# Patient Record
Sex: FEMALE | Race: WHITE | NOT HISPANIC OR LATINO | Employment: FULL TIME | URBAN - NONMETROPOLITAN AREA
[De-identification: names, ages, dates, MRNs, and addresses within clinical notes are randomized per-mention and may not be internally consistent; named-entity substitution may affect disease eponyms.]

---

## 2017-01-05 ENCOUNTER — OFFICE VISIT (OUTPATIENT)
Dept: FAMILY MEDICINE | Facility: OTHER | Age: 22
End: 2017-01-05
Attending: FAMILY MEDICINE
Payer: COMMERCIAL

## 2017-01-05 VITALS
WEIGHT: 135 LBS | DIASTOLIC BLOOD PRESSURE: 64 MMHG | SYSTOLIC BLOOD PRESSURE: 104 MMHG | RESPIRATION RATE: 14 BRPM | HEIGHT: 65 IN | HEART RATE: 74 BPM | TEMPERATURE: 98.4 F | BODY MASS INDEX: 22.49 KG/M2

## 2017-01-05 DIAGNOSIS — Z30.9 ENCOUNTER FOR CONTRACEPTIVE MANAGEMENT, UNSPECIFIED CONTRACEPTIVE ENCOUNTER TYPE: ICD-10-CM

## 2017-01-05 DIAGNOSIS — N92.6 IRREGULAR MENSTRUAL CYCLE: ICD-10-CM

## 2017-01-05 DIAGNOSIS — Z71.89 ACP (ADVANCE CARE PLANNING): Primary | Chronic | ICD-10-CM

## 2017-01-05 PROCEDURE — 99213 OFFICE O/P EST LOW 20 MIN: CPT | Performed by: FAMILY MEDICINE

## 2017-01-05 RX ORDER — NORETHINDRONE ACETATE AND ETHINYL ESTRADIOL .03; 1.5 MG/1; MG/1
1 TABLET ORAL DAILY
Qty: 63 TABLET | Refills: 3 | Status: SHIPPED | OUTPATIENT
Start: 2017-01-05 | End: 2017-11-20

## 2017-01-05 ASSESSMENT — ANXIETY QUESTIONNAIRES
3. WORRYING TOO MUCH ABOUT DIFFERENT THINGS: NOT AT ALL
6. BECOMING EASILY ANNOYED OR IRRITABLE: NOT AT ALL
GAD7 TOTAL SCORE: 0
1. FEELING NERVOUS, ANXIOUS, OR ON EDGE: NOT AT ALL
2. NOT BEING ABLE TO STOP OR CONTROL WORRYING: NOT AT ALL
5. BEING SO RESTLESS THAT IT IS HARD TO SIT STILL: NOT AT ALL
7. FEELING AFRAID AS IF SOMETHING AWFUL MIGHT HAPPEN: NOT AT ALL

## 2017-01-05 ASSESSMENT — PATIENT HEALTH QUESTIONNAIRE - PHQ9: 5. POOR APPETITE OR OVEREATING: NOT AT ALL

## 2017-01-05 ASSESSMENT — PAIN SCALES - GENERAL: PAINLEVEL: NO PAIN (0)

## 2017-01-05 NOTE — MR AVS SNAPSHOT
"              After Visit Summary   1/5/2017    Nerissa Roe    MRN: [id number]           Patient Information     Date Of Birth          1995        Visit Information        Provider Department      1/5/2017 2:45 PM Ana Lilia Garland MD [facility]        Today's Diagnoses     ACP (advance care planning)    -  1     Irregular menstrual cycle         Encounter for contraceptive management, unspecified contraceptive encounter type           Care Instructions    Medication refilled.  Plan for physical/pap age 22.        Follow-ups after your visit        Who to contact     If you have questions or need follow up information about today's clinic visit or your schedule please contact [facility] directly at [phone].  Normal or non-critical lab and imaging results will be communicated to you by MyChart, letter or phone within 4 business days after the clinic has received the results. If you do not hear from us within 7 days, please contact the clinic through MyChart or phone. If you have a critical or abnormal lab result, we will notify you by phone as soon as possible.  Submit refill requests through X[employer] or call your pharmacy and they will forward the refill request to us. Please allow 3 business days for your refill to be completed.          Additional Information About Your Visit        MyChart Information     X[employer]ion lets you send messages to your doctor, view your test results, renew your prescriptions, schedule appointments and more. To sign up, go to [email or web address] . Click on \"Log in\" on the left side of the screen, which will take you to the Welcome page. Then click on \"Sign up Now\" on the right side of the page.     You will be asked to enter the access code listed below, as well as some personal information. Please follow the directions to create your username and password.     Your access code is: [email or web address]  Expires: 4/5/2017  3:04 PM     Your access " "code will  in 90 days. If you need help or a new code, please call your [place] clinic or [phone].        Care EveryWhere ID     This is your Care EveryWhere ID. This could be used by other organizations to access your [place] medical records  [id number]        Your Vitals Were     Pulse Temperature Respirations Height BMI (Body Mass Index)       74 98.4  F (36.9  C) 14 5' 5\" (1.651 m) 22.47 kg/m2        Blood Pressure from Last 3 Encounters:   17 104/64   01/15/16 98/58   01/15/16 98/58    Weight from Last 3 Encounters:   17 135 lb (61.236 kg)   01/15/16 140 lb (63.504 kg) (70.92 %*)   01/15/16 140 lb (63.504 kg) (70.92 %*)     * Growth percentiles are based on [facility] 2-20 Years data.              Today, you had the following     No orders found for display         Today's Medication Changes          These changes are accurate as of: 17  3:04 PM.  If you have any questions, ask your nurse or doctor.               These medicines have changed or have updated prescriptions.        Dose/Directions    norethindrone-ethinyl estradiol 1.5-30 MG-MCG per tablet   Commonly known as:  JUNEL    This may have changed:  See the new instructions.   Used for:  Irregular menstrual cycle, Encounter for contraceptive management, unspecified contraceptive encounter type   Changed by:  Ana Lilia Garland MD        Dose:  1 tablet   Take 1 tablet by mouth daily   Quantity:  63 tablet   Refills:  3            Where to get your medicines      These medications were sent to [facility] [place] - [address]  [address] [address]     Phone:  [phone]    - norethindrone-ethinyl estradiol 1.5-30 MG-MCG per tablet             Primary Care Provider Office Phone # Fax #    Ana Lilia Garland [phone]        [facility] [address]  [place] [address]        Thank you!     Thank you for choosing [facility]  for your care. Our " goal is always to provide you with excellent care. Hearing back from our patients is one way we can continue to improve our services. Please take a few minutes to complete the written survey that you may receive in the mail after your visit with us. Thank you!             Your Updated Medication List - Protect others around you: Learn how to safely use, store and throw away your medicines at www.disposemymeds.org.          This list is accurate as of: 1/5/17  3:04 PM.  Always use your most recent med list.                   Brand Name Dispense Instructions for use    norethindrone-ethinyl estradiol 1.5-30 MG-MCG per tablet    JUNEL 1.5/30    63 tablet    Take 1 tablet by mouth daily       valACYclovir 500 MG tablet    VALTREX    6 tablet    TAKE 1 TABLET BY MOUTH TWICE DAILY

## 2017-01-05 NOTE — PROGRESS NOTES
"SUBJECTIVE:  Nerissa is a 21 year old female who comes in today for follow up contraception.  Her birth control was recalled, so pharmacy gave her a substitute, but is having trouble getting more than 1 month at time for refills.  Menses regular, normal flow.  Using condoms. Denies concern for std screening.  Would like to continue current script.    Current Outpatient Prescriptions   Medication     norethindrone-ethinyl estradiol (JUNEL 1.5/30) 1.5-30 MG-MCG per tablet     valACYclovir (VALTREX) 500 MG tablet     [DISCONTINUED] JUNEL 1.5/30 1.5-30 MG-MCG per tablet     No current facility-administered medications for this visit.        Allergies   Allergen Reactions     Seasonal Allergies        Past Medical History   Diagnosis Date     Herpes simplex without mention of complication 2/26/2015     Anxiety 10/22/2015     History reviewed. No pertinent past surgical history.  Social History     Social History     Marital Status: Single     Spouse Name: N/A     Number of Children: N/A     Years of Education: N/A     Occupational History     Not on file.     Social History Main Topics     Smoking status: Never Smoker      Smokeless tobacco: Never Used     Alcohol Use: No     Drug Use: No     Sexual Activity:     Partners: Male     Other Topics Concern      Service No     Blood Transfusions Yes     Caffeine Concern No     Occupational Exposure No     Hobby Hazards No     Sleep Concern No     Stress Concern No     Weight Concern No     Special Diet No     Back Care No     Exercise No     Seat Belt Yes     Self-Exams No     education given     Parent/Sibling W/ Cabg, Mi Or Angioplasty Before 65f 55m? No     Social History Narrative       ROS:  General: negative  Resp: negative  CV: negative  GI: negative  : positive for as above    OBJECTIVE:  Filed Vitals:    01/05/17 1437   BP: 104/64   Pulse: 74   Temp: 98.4  F (36.9  C)   Resp: 14   Height: 5' 5\" (1.651 m)   Weight: 135 lb (61.236 kg)     GENERAL APPEARANCE: " healthy, alert and no distress  RESP: lungs clear to auscultation - no rales, rhonchi or wheezes  CV: regular rates and rhythm, normal S1 S2, no S3 or S4 and no murmur, click or rub -  PSYCH: mentation appears normal. and affect normal/bright    ASSESSMENT/ORDERS:    ICD-10-CM    1. ACP (advance care planning) Z71.89    2. Irregular menstrual cycle N92.6 norethindrone-ethinyl estradiol (JUNEL 1.5/30) 1.5-30 MG-MCG per tablet   3. Encounter for contraceptive management, unspecified contraceptive encounter type Z30.9 norethindrone-ethinyl estradiol (JUNEL 1.5/30) 1.5-30 MG-MCG per tablet     PLAN:  Patient Instructions   Medication refilled.  Plan for physical/pap age 22.          Ana Lilia Loera

## 2017-01-05 NOTE — NURSING NOTE
"Chief Complaint   Patient presents with     Contraception     last prescription she was on was recalled. Was switched to Junel by the pharmacy as a substitute.        Initial /64 mmHg  Pulse 74  Temp(Src) 98.4  F (36.9  C)  Resp 14  Ht 5' 5\" (1.651 m)  Wt 135 lb (61.236 kg)  BMI 22.47 kg/m2 Estimated body mass index is 22.47 kg/(m^2) as calculated from the following:    Height as of this encounter: 5' 5\" (1.651 m).    Weight as of this encounter: 135 lb (61.236 kg).  BP completed using cuff size: marcia Field      "

## 2017-01-06 ASSESSMENT — ANXIETY QUESTIONNAIRES: GAD7 TOTAL SCORE: 0

## 2017-01-06 ASSESSMENT — PATIENT HEALTH QUESTIONNAIRE - PHQ9: SUM OF ALL RESPONSES TO PHQ QUESTIONS 1-9: 0

## 2017-04-04 ENCOUNTER — OFFICE VISIT (OUTPATIENT)
Dept: FAMILY MEDICINE | Facility: OTHER | Age: 22
End: 2017-04-04
Attending: FAMILY MEDICINE
Payer: COMMERCIAL

## 2017-04-04 ENCOUNTER — TELEPHONE (OUTPATIENT)
Dept: FAMILY MEDICINE | Facility: OTHER | Age: 22
End: 2017-04-04

## 2017-04-04 VITALS
RESPIRATION RATE: 16 BRPM | OXYGEN SATURATION: 99 % | TEMPERATURE: 98.5 F | HEART RATE: 86 BPM | SYSTOLIC BLOOD PRESSURE: 110 MMHG | DIASTOLIC BLOOD PRESSURE: 63 MMHG | WEIGHT: 125 LBS | BODY MASS INDEX: 20.8 KG/M2

## 2017-04-04 DIAGNOSIS — R00.0 TACHYCARDIA: Primary | ICD-10-CM

## 2017-04-04 LAB
ALBUMIN SERPL-MCNC: 3.8 G/DL (ref 3.4–5)
ALP SERPL-CCNC: 51 U/L (ref 40–150)
ALT SERPL W P-5'-P-CCNC: 16 U/L (ref 0–50)
ANION GAP SERPL CALCULATED.3IONS-SCNC: 9 MMOL/L (ref 3–14)
AST SERPL W P-5'-P-CCNC: 16 U/L (ref 0–45)
BASOPHILS # BLD AUTO: 0 10E9/L (ref 0–0.2)
BASOPHILS NFR BLD AUTO: 0.4 %
BILIRUB SERPL-MCNC: 0.5 MG/DL (ref 0.2–1.3)
BUN SERPL-MCNC: 15 MG/DL (ref 7–30)
CALCIUM SERPL-MCNC: 8.9 MG/DL (ref 8.5–10.1)
CHLORIDE SERPL-SCNC: 106 MMOL/L (ref 94–109)
CO2 SERPL-SCNC: 26 MMOL/L (ref 20–32)
CREAT SERPL-MCNC: 0.77 MG/DL (ref 0.52–1.04)
DIFFERENTIAL METHOD BLD: NORMAL
EOSINOPHIL # BLD AUTO: 0 10E9/L (ref 0–0.7)
EOSINOPHIL NFR BLD AUTO: 0.5 %
ERYTHROCYTE [DISTWIDTH] IN BLOOD BY AUTOMATED COUNT: 11.5 % (ref 10–15)
GFR SERPL CREATININE-BSD FRML MDRD: NORMAL ML/MIN/1.7M2
GLUCOSE SERPL-MCNC: 88 MG/DL (ref 70–99)
HCT VFR BLD AUTO: 40.3 % (ref 35–47)
HGB BLD-MCNC: 14.1 G/DL (ref 11.7–15.7)
IMM GRANULOCYTES # BLD: 0 10E9/L (ref 0–0.4)
IMM GRANULOCYTES NFR BLD: 0.2 %
LYMPHOCYTES # BLD AUTO: 2.6 10E9/L (ref 0.8–5.3)
LYMPHOCYTES NFR BLD AUTO: 30.5 %
MAGNESIUM SERPL-MCNC: 2.2 MG/DL (ref 1.6–2.3)
MCH RBC QN AUTO: 30.8 PG (ref 26.5–33)
MCHC RBC AUTO-ENTMCNC: 35 G/DL (ref 31.5–36.5)
MCV RBC AUTO: 88 FL (ref 78–100)
MONOCYTES # BLD AUTO: 0.4 10E9/L (ref 0–1.3)
MONOCYTES NFR BLD AUTO: 4.4 %
NEUTROPHILS # BLD AUTO: 5.5 10E9/L (ref 1.6–8.3)
NEUTROPHILS NFR BLD AUTO: 64 %
NRBC # BLD AUTO: 0 10*3/UL
NRBC BLD AUTO-RTO: 0 /100
PLATELET # BLD AUTO: 266 10E9/L (ref 150–450)
POTASSIUM SERPL-SCNC: 3.8 MMOL/L (ref 3.4–5.3)
PROT SERPL-MCNC: 7.4 G/DL (ref 6.8–8.8)
RBC # BLD AUTO: 4.58 10E12/L (ref 3.8–5.2)
SODIUM SERPL-SCNC: 141 MMOL/L (ref 133–144)
TSH SERPL DL<=0.05 MIU/L-ACNC: 0.81 MU/L (ref 0.4–4)
WBC # BLD AUTO: 8.6 10E9/L (ref 4–11)

## 2017-04-04 PROCEDURE — 83735 ASSAY OF MAGNESIUM: CPT | Performed by: FAMILY MEDICINE

## 2017-04-04 PROCEDURE — 93000 ELECTROCARDIOGRAM COMPLETE: CPT | Performed by: INTERNAL MEDICINE

## 2017-04-04 PROCEDURE — 80050 GENERAL HEALTH PANEL: CPT | Performed by: FAMILY MEDICINE

## 2017-04-04 PROCEDURE — 36415 COLL VENOUS BLD VENIPUNCTURE: CPT | Performed by: FAMILY MEDICINE

## 2017-04-04 PROCEDURE — 99214 OFFICE O/P EST MOD 30 MIN: CPT | Performed by: FAMILY MEDICINE

## 2017-04-04 PROCEDURE — 71020 ZZHC CHEST TWO VIEWS, FRONT/LAT: CPT | Mod: TC | Performed by: RADIOLOGY

## 2017-04-04 ASSESSMENT — ANXIETY QUESTIONNAIRES
3. WORRYING TOO MUCH ABOUT DIFFERENT THINGS: NOT AT ALL
1. FEELING NERVOUS, ANXIOUS, OR ON EDGE: NOT AT ALL
5. BEING SO RESTLESS THAT IT IS HARD TO SIT STILL: NOT AT ALL
6. BECOMING EASILY ANNOYED OR IRRITABLE: NOT AT ALL
2. NOT BEING ABLE TO STOP OR CONTROL WORRYING: NOT AT ALL
IF YOU CHECKED OFF ANY PROBLEMS ON THIS QUESTIONNAIRE, HOW DIFFICULT HAVE THESE PROBLEMS MADE IT FOR YOU TO DO YOUR WORK, TAKE CARE OF THINGS AT HOME, OR GET ALONG WITH OTHER PEOPLE: NOT DIFFICULT AT ALL
7. FEELING AFRAID AS IF SOMETHING AWFUL MIGHT HAPPEN: NOT AT ALL
GAD7 TOTAL SCORE: 0

## 2017-04-04 ASSESSMENT — PAIN SCALES - GENERAL: PAINLEVEL: NO PAIN (0)

## 2017-04-04 ASSESSMENT — PATIENT HEALTH QUESTIONNAIRE - PHQ9: 5. POOR APPETITE OR OVEREATING: NOT AT ALL

## 2017-04-04 NOTE — MR AVS SNAPSHOT
After Visit Summary   4/4/2017    Nerissa Roe    MRN: [id number]           Patient Information     Date Of Birth          1995        Visit Information        Provider Department      4/4/2017 11:30 AM Harpal Contreras MD [facility]        Today's Diagnoses     Tachycardia    -  1      Care Instructions      Understanding Supraventricular Tachycardia  Supraventricular tachycardia (SVT) is a type of abnormally fast heartbeat. The heart normally beats 60 to 100 beats per minute. With SVT, the heart beats more than 100 times a minute. It may beat as fast as 250 times a minute. This is caused by a problem in the electrical system of the heart. It can lessen the amount of blood pumped through the heart.  How the heart beats  A heartbeat is the rhythm of the heart as it contracts to squeeze blood through the body. It s caused by electrical signals in the heart. A beat starts when a special group of cells give off an electrical signal. These cells are in the sinoatrial (SA) node. The SA node is in the upper right chamber of your heart (atrium). The signal from the SA node travels down to the 2 lower chambers of your heart (ventricles). On the way, the signal goes through the atrioventricular (AV) node. This is a special group of cells between the atria and ventricles. From there, the signal travels to your left and right ventricles. As it travels, the signal tells nearby parts of your heart to contract. This causes your heart to pump in a coordinated way.  What is SVT?  When you have SVT, the signal to start your heartbeat doesn t come from the SA node. Instead it comes from another part of the left or right atrium. Or it comes from the AV node. Some area outside the SA node begins to fire quickly, causing a rapid heartbeat of over 100 beats per minute. This shortens the time your ventricles have to fill. If your heartbeat is fast enough, your heart may be unable to pump enough blood  forward to the rest of your body. The abnormal heartbeat may last for a few seconds to a few hours before your heart returns to its normal rhythm.  Types of SVT  There are several types of SVT. They include:    Atrial fibrillation. This is most common type of SVT. The upper chambers of the heart quiver very fast instead of pumping.    Atrial flutter. This type of SVT is a milder form of fibrillation. The upper chambers of the heart flutter instead of pumping normally.    Atrioventricular davonte reentrant tachycardia. This is the most common type in adults. It occurs when you have two channels through the AV node, instead of just one. The signal goes down one channel and up the other.    Atrioventricular reciprocating tachycardia. With this condition, there is an extra connection of muscle between the atrium and the ventricle. This is known as an accessory pathway. The signal goes down the AV node and back to the atrium through the accessory pathway. It then goes down the AV node again. In rare cases this condition leads to an abnormal heart rhythm that causes sudden death.    Atrial tachycardia. This is another common type of SVT. A small group of cells in the atria begin to fire abnormally and trigger the fast heartbeat.    Multifocal atrial tachycardia. Multiple groups of cells in your atria fire abnormally and trigger a fast heartbeat.  What causes SVT?  Some types of SVT run in families. Some people have heart problems from birth that cause SVT. High blood pressure, heart failure, mitral valve disease, sleep apnea, thyroid problems, and heart attacks can cause SVT. Smoking, excess caffeine or alcohol, and some medicines can increase your risk of having SVT.  Symptoms of SVT  When SVT happens, you may feel no symptoms. Or you may have:    Fluttering feelings in your chest (palpitations)    A tight feeling or pain in your chest    A pulsing feeling in your neck    Dizziness    Shortness of  breath    Tiredness    Fainting    Nausea  In rare cases, SVT can cause sudden death.  Diagnosing SVT  Your primary healthcare provider may diagnose you. Or you may see a heart doctor (cardiologist). The doctor will ask about your health history. He or she will also give you a physical exam. You may also have tests. These help show what kind of SVT you have, and what may cause it. They also help check for other problems. The tests may include:    Electrocardiogram (ECG), to analyze the abnormal rhythm    Continuous electrocardiogram, to watch your heart rhythm over a longer period    Blood tests, to look for various causes    Chest X-ray, to check for lung problems and look at the size of your heart    Exercise stress test, to see how well your heart works harder    Echocardiography, to check your heart structure and function    Electrophysiologic study (EPS), to check the electrical signals your heart    1302-2758 [employer]. [address] [place] [address]. All rights reserved. This information is not intended as a substitute for professional medical care. Always follow your healthcare professional's instructions.        Treatment for Supraventricular Tachycardia  Supraventricular tachycardia (SVT) is a type of abnormally fast heartbeat. The heart normally beats 60 to 100 beats per minute. With SVT, the heart beats more than 100 times a minute. It may beat as fast as 250 times a minute. This is caused by a problem in the electrical system of the heart. It can lessen the amount of blood pumped through the heart.  Types of treatment   You may not need treatment for SVT if you have only had one episode. If you do need treatment, there are several kinds. They include:    Valsalva maneuver. This is a way to increase pressure in the abdomen and chest. It can correct your heart rhythm right away. To do it, you bear down with your stomach muscles, as though you are trying to have a bowel  movement.    Carotid massage. Your healthcare provider may gently rub the carotid artery in your neck. This can also help correct the heart rhythm right away.    Medicine. There are various kinds you can take. Calcium channel blockers or adenosine can help correct heart rhythm right away. If you have SVT only 1 or 2 times a year, you may take beta-blockers or calcium channel medicine as needed. If your SVT is more frequent, you may need to take medicine every day. Some people may need to take several medicines to prevent episodes of SVT.    Electrocardioversion. This is a shock to the heart to restart a normal rhythm right away. This may be done if you have a severe episode of SVT.    Catheter ablation. This can help permanently stop SVT. Your healthcare provider puts a thin, flexible tube (catheter) into a blood vessel in the groin. He or she then gently pushes it up into your heart. The area of your heart that causes your SVT is then burned. This prevents that area from starting a signal that causes SVT.   Lifestyle changes to help prevent SVT episodes  Your healthcare provider might suggest other ways to help prevent SVT, such as:    Having less alcohol and caffeine    Not smoking    Lowering your stress    Eating foods that are healthy for your heart  When to call your healthcare provider  Call your healthcare provider if you have any of the following:    Sudden shortness of breath (call 911)    Severe palpitations    Severe dizziness    Severe chest pain    Symptoms that are happening more often     3520-6791 [employer]. [address] [place], [place] [address]. All rights reserved. This information is not intended as a substitute for professional medical care. Always follow your healthcare professional's instructions.      Results for orders placed or performed in visit on 04/04/17 (from the past 24 hour(s))   CBC with platelets differential   Result Value Ref Range    WBC 8.6 4.0 - 11.0 10e9/L     RBC Count 4.58 3.8 - 5.2 10e12/L    Hemoglobin 14.1 11.7 - 15.7 g/dL    Hematocrit 40.3 35.0 - 47.0 %    MCV 88 78 - 100 fl    MCH 30.8 26.5 - 33.0 pg    MCHC 35.0 31.5 - 36.5 g/dL    RDW 11.5 10.0 - 15.0 %    Platelet Count 266 150 - 450 10e9/L    Diff Method Automated Method     % Neutrophils 64.0 %    % Lymphocytes 30.5 %    % Monocytes 4.4 %    % Eosinophils 0.5 %    % Basophils 0.4 %    % Immature Granulocytes 0.2 %    Nucleated RBCs 0 0 /100    Absolute Neutrophil 5.5 1.6 - 8.3 10e9/L    Absolute Lymphocytes 2.6 0.8 - 5.3 10e9/L    Absolute Monocytes 0.4 0.0 - 1.3 10e9/L    Absolute Eosinophils 0.0 0.0 - 0.7 10e9/L    Absolute Basophils 0.0 0.0 - 0.2 10e9/L    Abs Immature Granulocytes 0.0 0 - 0.4 10e9/L    Absolute Nucleated RBC 0.0    Comprehensive metabolic panel   Result Value Ref Range    Sodium 141 133 - 144 mmol/L    Potassium 3.8 3.4 - 5.3 mmol/L    Chloride 106 94 - 109 mmol/L    Carbon Dioxide 26 20 - 32 mmol/L    Anion Gap 9 3 - 14 mmol/L    Glucose 88 70 - 99 mg/dL    Urea Nitrogen 15 7 - 30 mg/dL    Creatinine 0.77 0.52 - 1.04 mg/dL    GFR Estimate >90  Non  GFR Calc   >60 mL/min/1.7m2    GFR Estimate If Black >90   GFR Calc   >60 mL/min/1.7m2    Calcium 8.9 8.5 - 10.1 mg/dL    Bilirubin Total 0.5 0.2 - 1.3 mg/dL    Albumin 3.8 3.4 - 5.0 g/dL    Protein Total 7.4 6.8 - 8.8 g/dL    Alkaline Phosphatase 51 40 - 150 U/L    ALT 16 0 - 50 U/L    AST 16 0 - 45 U/L   TSH   Result Value Ref Range    TSH 0.81 0.40 - 4.00 mU/L   Magnesium   Result Value Ref Range    Magnesium 2.2 1.6 - 2.3 mg/dL             Follow-ups after your visit        Your next 10 appointments already scheduled     Apr 19, 2017 10:45 AM CDT   (Arrive by 10:30 AM)   SHORT with Ana Lilia Garland MD   [facility]    [address] [place] [address]   [phone]           Please have the patient arrive 15 minutes early.              Who to contact     If you have  "questions or need follow up information about today's clinic visit or your schedule please contact [facility] [place] directly at [phone].  Normal or non-critical lab and imaging results will be communicated to you by MyChart, letter or phone within 4 business days after the clinic has received the results. If you do not hear from us within 7 days, please contact the clinic through i-markerhart or phone. If you have a critical or abnormal lab result, we will notify you by phone as soon as possible.  Submit refill requests through [employer] or call your pharmacy and they will forward the refill request to us. Please allow 3 business days for your refill to be completed.          Additional Information About Your Visit        i-[email or web address] Information     [employer] lets you send messages to your doctor, view your test results, renew your prescriptions, schedule appointments and more. To sign up, go to [email or web address] . Click on \"Log in\" on the left side of the screen, which will take you to the Welcome page. Then click on \"Sign up Now\" on the right side of the page.     You will be asked to enter the access code listed below, as well as some personal information. Please follow the directions to create your username and password.     Your access code is: [email or web address]  Expires: 2017  4:04 PM     Your access code will  in 90 days. If you need help or a new code, please call your [place] clinic or [phone].        Care EveryWhere ID     This is your Care EveryWhere ID. This could be used by other organizations to access your [place] medical records  [id number]        Your Vitals Were     Pulse Temperature Respirations Pulse Oximetry BMI (Body Mass Index)       86 98.5  F (36.9  C) 16 99% 20.8 kg/m2        Blood Pressure from Last 3 Encounters:   17 110/63   17 104/64   01/15/16 98/58    Weight from Last 3 Encounters:   17 125 lb (56.7 kg)   17 135 lb (61.2 kg)   01/15/16 140 " lb (63.5 kg) (71 %)*     * Growth percentiles are based on CDC 2-20 Years data.              We Performed the Following     Cardiac Event Monitor - Peds/Adult     CBC with platelets differential     Comprehensive metabolic panel     EKG 12-lead complete w/read - Clinics     Magnesium     TSH     XR Chest 2 Views        Primary Care Provider Office Phone # Fax #    Ana Lilia Garland [phone]       [facility] [address] [place] [address]  [place] [address]        Thank you!     Thank you for choosing [facility]  [facility] your [facility]. Our goal is always to provide you with excellent care. Hearing back from our patients is one way we can continue to improve our services. Please take a few minutes to complete the written survey that you may receive in the mail after your visit with us. Thank you!             Your Updated Medication List - Protect others around you: Learn how to safely use, store and throw away your medicines at www.disposemymeds.org.          This list is accurate as of: 4/4/17 12:32 PM.  Always use your most recent med list.                   Brand Name Dispense Instructions for use    norethindrone-ethinyl estradiol 1.5-30 MG-MCG per tablet    JUNEL 1.5/30    63 tablet    Take 1 tablet by mouth daily       valACYclovir 500 MG tablet    VALTREX    6 tablet    TAKE 1 TABLET BY MOUTH TWICE DAILY

## 2017-04-04 NOTE — NURSING NOTE
"Chief Complaint   Patient presents with     Heart Problem       Initial /63  Pulse 86  Temp 98.5  F (36.9  C)  Resp 16  Wt 125 lb (56.7 kg)  SpO2 99%  BMI 20.8 kg/m2 Estimated body mass index is 20.8 kg/(m^2) as calculated from the following:    Height as of 1/5/17: 5' 5\" (1.651 m).    Weight as of this encounter: 125 lb (56.7 kg).  Medication Reconciliation: complete  "

## 2017-04-04 NOTE — PROGRESS NOTES
"  SUBJECTIVE:                                                    Nerissa Roe is a 21 year old female who presents to clinic today for the following health issues:        Racing heart beat       Duration: 2 months     Description (location/character/radiation): heart    Intensity:  mild    Accompanying signs and symptoms: racing heart rate, 3 plus  episodes, at rest  feels very warm when it happens last for 30-45 seconds, some SOB at time of episode, dizziness     History (similar episodes/previous evaluation): None    Precipitating or alleviating factors: None    Therapies tried and outcome: None    Seems to be more frequent.    At rest    Not anxious or stressed.    Starts with hot flash and sweaty then heart starts racing.    Feels weak and sob.    When HR slows it slows VERY quickly to normal HR     Holding breathe- helped last time    No FH dysrhythmia    Some CAD  In family    No - minimal caffeine- no supplements    Recent new BCP.    Never took HR /pulse.     Had Fit bit on once and was \"over a hundred\"    No tobacco or drugs.            Problem list and histories reviewed & adjusted, as indicated.  Additional history: as documented    Labs reviewed in EPIC    ROS:  C: NEGATIVE for fever, chills, change in weight  E/M: NEGATIVE for ear, mouth and throat problems  R: NEGATIVE for significant cough or SOB  CV: NEGATIVE for chest pain, palpitations or peripheral edema  ROS otherwise negative    OBJECTIVE:                                                    /63  Pulse 86  Temp 98.5  F (36.9  C)  Resp 16  Wt 125 lb (56.7 kg)  SpO2 99%  BMI 20.8 kg/m2  Body mass index is 20.8 kg/(m^2).   GENERAL: healthy, alert, well nourished, well hydrated, no distress  HENT: ear canals- normal; TMs- normal; Nose- normal; Mouth- no ulcers, no lesions  NECK: no tenderness, no adenopathy, no asymmetry, no masses, no stiffness; thyroid- normal to palpation  RESP: lungs clear to auscultation - no rales, no rhonchi, no " wheezes  CV: regular rates and rhythm, normal S1 S2, no S3 or S4 and no murmur, no click or rub -  ABDOMEN: soft, no tenderness, no  hepatosplenomegaly, no masses, normal bowel sounds  MS: extremities- no gross deformities noted, no edema  PSYCH: Alert and oriented times 3; speech- coherent , normal rate and volume; able to articulate logical thoughts, able to abstract reason, no tangential thoughts, no hallucinations or delusions, affect- normal         Results for orders placed or performed in visit on 04/04/17 (from the past 24 hour(s))   CBC with platelets differential   Result Value Ref Range    WBC 8.6 4.0 - 11.0 10e9/L    RBC Count 4.58 3.8 - 5.2 10e12/L    Hemoglobin 14.1 11.7 - 15.7 g/dL    Hematocrit 40.3 35.0 - 47.0 %    MCV 88 78 - 100 fl    MCH 30.8 26.5 - 33.0 pg    MCHC 35.0 31.5 - 36.5 g/dL    RDW 11.5 10.0 - 15.0 %    Platelet Count 266 150 - 450 10e9/L    Diff Method Automated Method     % Neutrophils 64.0 %    % Lymphocytes 30.5 %    % Monocytes 4.4 %    % Eosinophils 0.5 %    % Basophils 0.4 %    % Immature Granulocytes 0.2 %    Nucleated RBCs 0 0 /100    Absolute Neutrophil 5.5 1.6 - 8.3 10e9/L    Absolute Lymphocytes 2.6 0.8 - 5.3 10e9/L    Absolute Monocytes 0.4 0.0 - 1.3 10e9/L    Absolute Eosinophils 0.0 0.0 - 0.7 10e9/L    Absolute Basophils 0.0 0.0 - 0.2 10e9/L    Abs Immature Granulocytes 0.0 0 - 0.4 10e9/L    Absolute Nucleated RBC 0.0    Comprehensive metabolic panel   Result Value Ref Range    Sodium 141 133 - 144 mmol/L    Potassium 3.8 3.4 - 5.3 mmol/L    Chloride 106 94 - 109 mmol/L    Carbon Dioxide 26 20 - 32 mmol/L    Anion Gap 9 3 - 14 mmol/L    Glucose 88 70 - 99 mg/dL    Urea Nitrogen 15 7 - 30 mg/dL    Creatinine 0.77 0.52 - 1.04 mg/dL    GFR Estimate >90  Non  GFR Calc   >60 mL/min/1.7m2    GFR Estimate If Black >90   GFR Calc   >60 mL/min/1.7m2    Calcium 8.9 8.5 - 10.1 mg/dL    Bilirubin Total 0.5 0.2 - 1.3 mg/dL    Albumin 3.8 3.4 - 5.0  g/dL    Protein Total 7.4 6.8 - 8.8 g/dL    Alkaline Phosphatase 51 40 - 150 U/L    ALT 16 0 - 50 U/L    AST 16 0 - 45 U/L   TSH   Result Value Ref Range    TSH 0.81 0.40 - 4.00 mU/L   Magnesium   Result Value Ref Range    Magnesium 2.2 1.6 - 2.3 mg/dL     CXR and EKG unremarkable   ASSESSMENT/PLAN:                                                      (R00.0) Tachycardia  (primary encounter diagnosis)  Comment: discussed at length. Probable SVT by hx.  Plan: CBC with platelets differential, Comprehensive         metabolic panel, TSH, XR Chest 2 Views, EKG         12-lead complete w/read - Clinics, Magnesium,         Cardiac Event Monitor - Peds/Adult        Will order Event monitor.  Discussed checking how fast pulse is and if regular or irregular when / if happens again.  Discussed SVT and maneuvers to stop SVT. Discussed on when to go to ER. Handout on SVT given.  Labs and w/u negative today. F/u with DR WEEMS in 4-6 weeks. Will call when get results from Event monitor.      Symptomatic treatment was discussed along when patient should call and/or come back into the clinic or go to ER/Urgent care. All questions answered.         See Patient Instructions    Harpal Contreras MD  [facility]

## 2017-04-04 NOTE — TELEPHONE ENCOUNTER
8:33 AM    Reason for Call: Phone Call    Description: Pts mother called and states that she would like a call back regarding her heart, she has had 3 episodes of racing heart, would like a call back     Was an appointment offered for this call? Yes (04/19/17)    Preferred method for responding to this message: Telephone Call    If we cannot reach you directly, may we leave a detailed response at the number you provided? Yes    Can this message wait until your PCP/provider returns, if available today? Chelsea Monotya

## 2017-04-04 NOTE — PATIENT INSTRUCTIONS
Understanding Supraventricular Tachycardia  Supraventricular tachycardia (SVT) is a type of abnormally fast heartbeat. The heart normally beats 60 to 100 beats per minute. With SVT, the heart beats more than 100 times a minute. It may beat as fast as 250 times a minute. This is caused by a problem in the electrical system of the heart. It can lessen the amount of blood pumped through the heart.  How the heart beats  A heartbeat is the rhythm of the heart as it contracts to squeeze blood through the body. It s caused by electrical signals in the heart. A beat starts when a special group of cells give off an electrical signal. These cells are in the sinoatrial (SA) node. The SA node is in the upper right chamber of your heart (atrium). The signal from the SA node travels down to the 2 lower chambers of your heart (ventricles). On the way, the signal goes through the atrioventricular (AV) node. This is a special group of cells between the atria and ventricles. From there, the signal travels to your left and right ventricles. As it travels, the signal tells nearby parts of your heart to contract. This causes your heart to pump in a coordinated way.  What is SVT?  When you have SVT, the signal to start your heartbeat doesn t come from the SA node. Instead it comes from another part of the left or right atrium. Or it comes from the AV node. Some area outside the SA node begins to fire quickly, causing a rapid heartbeat of over 100 beats per minute. This shortens the time your ventricles have to fill. If your heartbeat is fast enough, your heart may be unable to pump enough blood forward to the rest of your body. The abnormal heartbeat may last for a few seconds to a few hours before your heart returns to its normal rhythm.  Types of SVT  There are several types of SVT. They include:    Atrial fibrillation. This is most common type of SVT. The upper chambers of the heart quiver very fast instead of pumping.    Atrial  flutter. This type of SVT is a milder form of fibrillation. The upper chambers of the heart flutter instead of pumping normally.    Atrioventricular davonte reentrant tachycardia. This is the most common type in adults. It occurs when you have two channels through the AV node, instead of just one. The signal goes down one channel and up the other.    Atrioventricular reciprocating tachycardia. With this condition, there is an extra connection of muscle between the atrium and the ventricle. This is known as an accessory pathway. The signal goes down the AV node and back to the atrium through the accessory pathway. It then goes down the AV node again. In rare cases this condition leads to an abnormal heart rhythm that causes sudden death.    Atrial tachycardia. This is another common type of SVT. A small group of cells in the atria begin to fire abnormally and trigger the fast heartbeat.    Multifocal atrial tachycardia. Multiple groups of cells in your atria fire abnormally and trigger a fast heartbeat.  What causes SVT?  Some types of SVT run in families. Some people have heart problems from birth that cause SVT. High blood pressure, heart failure, mitral valve disease, sleep apnea, thyroid problems, and heart attacks can cause SVT. Smoking, excess caffeine or alcohol, and some medicines can increase your risk of having SVT.  Symptoms of SVT  When SVT happens, you may feel no symptoms. Or you may have:    Fluttering feelings in your chest (palpitations)    A tight feeling or pain in your chest    A pulsing feeling in your neck    Dizziness    Shortness of breath    Tiredness    Fainting    Nausea  In rare cases, SVT can cause sudden death.  Diagnosing SVT  Your primary healthcare provider may diagnose you. Or you may see a heart doctor (cardiologist). The doctor will ask about your health history. He or she will also give you a physical exam. You may also have tests. These help show what kind of SVT you have, and what  may cause it. They also help check for other problems. The tests may include:    Electrocardiogram (ECG), to analyze the abnormal rhythm    Continuous electrocardiogram, to watch your heart rhythm over a longer period    Blood tests, to look for various causes    Chest X-ray, to check for lung problems and look at the size of your heart    Exercise stress test, to see how well your heart works harder    Echocardiography, to check your heart structure and function    Electrophysiologic study (EPS), to check the electrical signals your heart    8275-1190 [employer]. [address] [place] [address]. All rights reserved. This information is not intended as a substitute for professional medical care. Always follow your healthcare professional's instructions.        Treatment for Supraventricular Tachycardia  Supraventricular tachycardia (SVT) is a type of abnormally fast heartbeat. The heart normally beats 60 to 100 beats per minute. With SVT, the heart beats more than 100 times a minute. It may beat as fast as 250 times a minute. This is caused by a problem in the electrical system of the heart. It can lessen the amount of blood pumped through the heart.  Types of treatment   You may not need treatment for SVT if you have only had one episode. If you do need treatment, there are several kinds. They include:    Valsalva maneuver. This is a way to increase pressure in the abdomen and chest. It can correct your heart rhythm right away. To do it, you bear down with your stomach muscles, as though you are trying to have a bowel movement.    Carotid massage. Your healthcare provider may gently rub the carotid artery in your neck. This can also help correct the heart rhythm right away.    Medicine. There are various kinds you can take. Calcium channel blockers or adenosine can help correct heart rhythm right away. If you have SVT only 1 or 2 times a year, you may take beta-blockers or calcium channel  medicine as needed. If your SVT is more frequent, you may need to take medicine every day. Some people may need to take several medicines to prevent episodes of SVT.    Electrocardioversion. This is a shock to the heart to restart a normal rhythm right away. This may be done if you have a severe episode of SVT.    Catheter ablation. This can help permanently stop SVT. Your healthcare provider puts a thin, flexible tube (catheter) into a blood vessel in the groin. He or she then gently pushes it up into your heart. The area of your heart that causes your SVT is then burned. This prevents that area from starting a signal that causes SVT.   Lifestyle changes to help prevent SVT episodes  Your healthcare provider might suggest other ways to help prevent SVT, such as:    Having less alcohol and caffeine    Not smoking    Lowering your stress    Eating foods that are healthy for your heart  When to call your healthcare provider  Call your healthcare provider if you have any of the following:    Sudden shortness of breath (call 911)    Severe palpitations    Severe dizziness    Severe chest pain    Symptoms that are happening more often     9183-0220 [employer]. [address] [place] [address]. All rights reserved. This information is not intended as a substitute for professional medical care. Always follow your healthcare professional's instructions.      Results for orders placed or performed in visit on 04/04/17 (from the past 24 hour(s))   CBC with platelets differential   Result Value Ref Range    WBC 8.6 4.0 - 11.0 10e9/L    RBC Count 4.58 3.8 - 5.2 10e12/L    Hemoglobin 14.1 11.7 - 15.7 g/dL    Hematocrit 40.3 35.0 - 47.0 %    MCV 88 78 - 100 fl    MCH 30.8 26.5 - 33.0 pg    MCHC 35.0 31.5 - 36.5 g/dL    RDW 11.5 10.0 - 15.0 %    Platelet Count 266 150 - 450 10e9/L    Diff Method Automated Method     % Neutrophils 64.0 %    % Lymphocytes 30.5 %    % Monocytes 4.4 %    % Eosinophils 0.5 %     % Basophils 0.4 %    % Immature Granulocytes 0.2 %    Nucleated RBCs 0 0 /100    Absolute Neutrophil 5.5 1.6 - 8.3 10e9/L    Absolute Lymphocytes 2.6 0.8 - 5.3 10e9/L    Absolute Monocytes 0.4 0.0 - 1.3 10e9/L    Absolute Eosinophils 0.0 0.0 - 0.7 10e9/L    Absolute Basophils 0.0 0.0 - 0.2 10e9/L    Abs Immature Granulocytes 0.0 0 - 0.4 10e9/L    Absolute Nucleated RBC 0.0    Comprehensive metabolic panel   Result Value Ref Range    Sodium 141 133 - 144 mmol/L    Potassium 3.8 3.4 - 5.3 mmol/L    Chloride 106 94 - 109 mmol/L    Carbon Dioxide 26 20 - 32 mmol/L    Anion Gap 9 3 - 14 mmol/L    Glucose 88 70 - 99 mg/dL    Urea Nitrogen 15 7 - 30 mg/dL    Creatinine 0.77 0.52 - 1.04 mg/dL    GFR Estimate >90  Non  GFR Calc   >60 mL/min/1.7m2    GFR Estimate If Black >90   GFR Calc   >60 mL/min/1.7m2    Calcium 8.9 8.5 - 10.1 mg/dL    Bilirubin Total 0.5 0.2 - 1.3 mg/dL    Albumin 3.8 3.4 - 5.0 g/dL    Protein Total 7.4 6.8 - 8.8 g/dL    Alkaline Phosphatase 51 40 - 150 U/L    ALT 16 0 - 50 U/L    AST 16 0 - 45 U/L   TSH   Result Value Ref Range    TSH 0.81 0.40 - 4.00 mU/L   Magnesium   Result Value Ref Range    Magnesium 2.2 1.6 - 2.3 mg/dL

## 2017-04-05 ENCOUNTER — HOSPITAL ENCOUNTER (OUTPATIENT)
Dept: NUCLEAR MEDICINE | Facility: HOSPITAL | Age: 22
Discharge: HOME | End: 2017-04-05
Attending: FAMILY MEDICINE | Admitting: FAMILY MEDICINE
Payer: COMMERCIAL

## 2017-04-05 PROCEDURE — 93270 REMOTE 30 DAY ECG REV/REPORT: CPT | Mod: TC

## 2017-04-05 PROCEDURE — 93272 ECG/REVIEW INTERPRET ONLY: CPT | Performed by: INTERNAL MEDICINE

## 2017-04-05 ASSESSMENT — ANXIETY QUESTIONNAIRES: GAD7 TOTAL SCORE: 0

## 2017-07-24 ENCOUNTER — OFFICE VISIT (OUTPATIENT)
Dept: CHIROPRACTIC MEDICINE | Facility: OTHER | Age: 22
End: 2017-07-24
Attending: CHIROPRACTOR
Payer: COMMERCIAL

## 2017-07-24 DIAGNOSIS — M54.50 ACUTE BILATERAL LOW BACK PAIN WITHOUT SCIATICA: ICD-10-CM

## 2017-07-24 DIAGNOSIS — M99.02 SEGMENTAL AND SOMATIC DYSFUNCTION OF THORACIC REGION: ICD-10-CM

## 2017-07-24 DIAGNOSIS — M99.03 SEGMENTAL AND SOMATIC DYSFUNCTION OF LUMBAR REGION: Primary | ICD-10-CM

## 2017-07-24 PROCEDURE — 98940 CHIROPRACT MANJ 1-2 REGIONS: CPT | Performed by: CHIROPRACTOR

## 2017-07-24 PROCEDURE — 99202 OFFICE O/P NEW SF 15 MIN: CPT | Mod: 25 | Performed by: CHIROPRACTOR

## 2017-07-24 NOTE — MR AVS SNAPSHOT
"              After Visit Summary   2017    Nerissa Roe    MRN: [id number]           Patient Information     Date Of Birth          1995        Visit Information        Provider Department      2017 3:40 PM Abilio Calderon DC  [facility] [place]        Today's Diagnoses     Segmental and somatic dysfunction of lumbar region    -  1    Acute bilateral low back pain without sciatica        Segmental and somatic dysfunction of thoracic region           Follow-ups after your visit        Who to contact     If you have questions or need follow up information about today's clinic visit or your schedule please contact  [facility] directly at [phone].  Normal or non-critical lab and imaging results will be communicated to you by [employer], letter or phone within 4 business days after the clinic has received the results. If you do not hear from us within 7 days, please contact the clinic through [employer] or phone. If you have a critical or abnormal lab result, we will notify you by phone as soon as possible.  Submit refill requests through [employer] or call your pharmacy and they will forward the refill request to us. Please allow 3 business days for your refill to be completed.          Additional Information About Your Visit        MyChart Information     [employer] lets you send messages to your doctor, view your test results, renew your prescriptions, schedule appointments and more. To sign up, go to [email or web address] . Click on \"Log in\" on the left side of the screen, which will take you to the Welcome page. Then click on \"Sign up Now\" on the right side of the page.     You will be asked to enter the access code listed below, as well as some personal information. Please follow the directions to create your username and password.     Your access code is: [email or web address]  Expires: 10/29/2017  1:11 PM     Your access code will  in 90 days. If you need help or a new code, please " call your [facility] clinic or [phone].        Care EveryWhere ID     This is your Care EveryWhere ID. This could be used by other organizations to access your [facility] medical records  [id number]         Blood Pressure from Last 3 Encounters:   04/04/17 110/63   01/05/17 104/64   01/15/16 98/58    Weight from Last 3 Encounters:   04/04/17 125 lb (56.7 kg)   01/05/17 135 lb (61.2 kg)   01/15/16 140 lb (63.5 kg) (71 %)*     * Growth percentiles are based on [facility] 2-20 Years data.              We Performed the Following     CHIROPRAC MANIP,SPINAL,1-2 REGIONS        Primary Care Provider Office Phone # Fax #    Ana Lilia Garland [phone]       [facility] [address]  [facility] [address]        Equal Access to Services     [facility]: Hadii aad ku hadasho Soomaali, waaxda luqadaha, qaybta kaalmada adeegyada, waxay idiin hayaan adenorris calvillo . So [facility] [phone].    ATENCIÓN: Si habla español, tiene a starr disposición servicios gratuitos de asistencia lingüística. Llame al [phone].    We comply with applicable federal civil rights laws and [place] laws. We do not discriminate on the basis of race, color, national origin, age, disability sex, sexual orientation or gender identity.            Thank you!     Thank you for choosing  [facility]  for your care. Our goal is always to provide you with excellent care. Hearing back from our patients is one way we can continue to improve our services. Please take a few minutes to complete the written survey that you may receive in the mail after your visit with us. Thank you!             Your Updated Medication List - Protect others around you: Learn how to safely use, store and throw away your medicines at www.disposemymeds.org.          This list is accurate as of: 7/24/17 11:59 PM.  Always use your most recent med list.                   Brand Name Dispense Instructions for use Diagnosis    norethindrone-ethinyl  estradiol 1.5-30 MG-MCG per tablet    JUNEL 1.5/30    63 tablet    Take 1 tablet by mouth daily    Irregular menstrual cycle, Encounter for contraceptive management, unspecified contraceptive encounter type       valACYclovir 500 MG tablet    VALTREX    6 tablet    TAKE 1 TABLET BY MOUTH TWICE DAILY    Oral herpes

## 2017-07-31 NOTE — PROGRESS NOTES
Subjective Finding:    Chief compalint: Patient presents with:  Back Pain: mid and low back pain  , Pain Scale: 2/10, Intensity: sharp, Duration: 1 weeks, Radiating:  no.    Date of injury:     Activities that the pain restricts:   Home/household/hobbies/social activities: yes.  Work duties: yes.  Sleep: yes.  Makes symptoms better: rest.  Makes symptoms worse: activity.  Have you seen anyone else for the symptoms? No.  Work related: no.  Automobile related injury: no.    Objective and Assessment:    Posture Analysis:   High shoulder: .  Head tilt: .  High iliac crest: .  Head carriage: neutral.  Thoracic Kyphosis: neutral.  Lumbar Lordosis: forward.    Lumbar Range of Motion: extension decreased, left lateral flexion decreased and right lateral flexion decreased.  Cervical Range of Motion: .  Thoracic Range of Motion: .  Extremity Range of Motion: .    Palpation:   Quad lumb: bilateral, referred pain: no    Segmental dysfunction pre-treatment and treatment area: T11, T12, L4 and L5.    Assessment post-treatment:  Cervical: .  Thoracic: ROM increased.  Lumbar: ROM increased.    Comments: .      Complicating Factors: .    Procedure(s):  CMT:  80398 Chiropractic manipulative treatment 1-2 regions performed   Thoracic: Diversified, See above for level, Prone and Lumbar: Diversified, See above for level, Side posture    Modalities:  None performed this visit    Therapeutic procedures:  None    Plan:  Treatment plan: PRN.  Instructed patient: stretch as instructed at visit.  Short term goals: increase ROM.  Long term goals: restore normal function.  Prognosis: excellent.

## 2017-08-28 ENCOUNTER — OFFICE VISIT (OUTPATIENT)
Dept: FAMILY MEDICINE | Facility: OTHER | Age: 22
End: 2017-08-28
Attending: FAMILY MEDICINE
Payer: COMMERCIAL

## 2017-08-28 VITALS
OXYGEN SATURATION: 98 % | DIASTOLIC BLOOD PRESSURE: 68 MMHG | WEIGHT: 125 LBS | HEIGHT: 65 IN | TEMPERATURE: 98.6 F | BODY MASS INDEX: 20.83 KG/M2 | HEART RATE: 86 BPM | SYSTOLIC BLOOD PRESSURE: 104 MMHG

## 2017-08-28 DIAGNOSIS — M54.6 ACUTE MIDLINE THORACIC BACK PAIN: Primary | ICD-10-CM

## 2017-08-28 PROCEDURE — 72070 X-RAY EXAM THORAC SPINE 2VWS: CPT | Mod: TC | Performed by: RADIOLOGY

## 2017-08-28 PROCEDURE — 99213 OFFICE O/P EST LOW 20 MIN: CPT | Performed by: FAMILY MEDICINE

## 2017-08-28 ASSESSMENT — ANXIETY QUESTIONNAIRES
6. BECOMING EASILY ANNOYED OR IRRITABLE: NOT AT ALL
3. WORRYING TOO MUCH ABOUT DIFFERENT THINGS: NOT AT ALL
7. FEELING AFRAID AS IF SOMETHING AWFUL MIGHT HAPPEN: NOT AT ALL
5. BEING SO RESTLESS THAT IT IS HARD TO SIT STILL: NOT AT ALL
1. FEELING NERVOUS, ANXIOUS, OR ON EDGE: NOT AT ALL
GAD7 TOTAL SCORE: 0
2. NOT BEING ABLE TO STOP OR CONTROL WORRYING: NOT AT ALL
4. TROUBLE RELAXING: NOT AT ALL

## 2017-08-28 ASSESSMENT — PAIN SCALES - GENERAL: PAINLEVEL: MILD PAIN (3)

## 2017-08-28 ASSESSMENT — PATIENT HEALTH QUESTIONNAIRE - PHQ9: SUM OF ALL RESPONSES TO PHQ QUESTIONS 1-9: 1

## 2017-08-28 NOTE — MR AVS SNAPSHOT
"              After Visit Summary   8/28/2017    Nerissa Roe    MRN: [id number]           Patient Information     Date Of Birth          1995        Visit Information        Provider Department      8/28/2017 9:45 AM Ana Lilia Garland MD [facility]        Today's Diagnoses     Acute midline thoracic back pain    -  1      Care Instructions    Will call with xray results.  Will set up with physical therapy. Continue ice/heat, Tylenol/Ibuprofen.  Follow up if not resolving.          Follow-ups after your visit        Additional Services     PHYSICAL THERAPY REFERRAL       *This therapy referral will be filtered to a centralized scheduling office at [facility] and the patient will receive a call to schedule an appointment at a [facility] location most convenient for them. *     [facility] provides Physical Therapy evaluation and treatment and many specialty services across the [facility] system.  If requesting a specialty program, please choose from the list below.    If you have not heard from the scheduling office within 2 business days, please call [phone] for all locations, with the exception of [facility], please call [phone].  Treatment: Evaluation & Treatment  Special Instructions/Modalities:   Special Programs: None    Please be aware that coverage of these services is subject to the terms and limitations of your health insurance plan.  Call member services at your health plan with any benefit or coverage questions.      **Note to Provider:  If you are referring outside of [facility] for the therapy appointment, please list the name of the location in the \"special instructions\" above, print the referral and give to the patient to schedule the appointment.                  Who to contact     If you have questions or need follow up information about today's clinic visit or your schedule please contact [facility] directly at " "[phone].  Normal or non-critical lab and imaging results will be communicated to you by TripleLifthart, letter or phone within 4 business days after the clinic has received the results. If you do not hear from us within 7 days, please contact the clinic through TripleLifthart or phone. If you have a critical or abnormal lab result, we will notify you by phone as soon as possible.  Submit refill requests through SCHEDit or call your pharmacy and they will forward the refill request to us. Please allow 3 business days for your refill to be completed.          Additional Information About Your Visit        TripleLifthart Information     SCHEDit lets you send messages to your doctor, view your test results, renew your prescriptions, schedule appointments and more. To sign up, go to [email or web address] . Click on \"Log in\" on the left side of the screen, which will take you to the Welcome page. Then click on \"Sign up Now\" on the right side of the page.     You will be asked to enter the access code listed below, as well as some personal information. Please follow the directions to create your username and password.     Your access code is: [email or web address]  Expires: 10/29/2017  1:11 PM     Your access code will  in 90 days. If you need help or a new code, please call your [facility] clinic or [phone].        Care EveryWhere ID     This is your Care EveryWhere ID. This could be used by other organizations to access your [facility] medical records  [id number]        Your Vitals Were     Pulse Temperature Height Pulse Oximetry BMI (Body Mass Index)       86 98.6  F (37  C) (Tympanic) 5' 5\" (1.651 m) 98% 20.8 kg/m2        Blood Pressure from Last 3 Encounters:   17 104/68   17 110/63   17 104/64    Weight from Last 3 Encounters:   17 125 lb (56.7 kg)   17 125 lb (56.7 kg)   17 135 lb (61.2 kg)              We Performed the Following     PHYSICAL THERAPY REFERRAL     XR Thoracic Spine 2 Views " (Clinic Performed)        Primary Care Provider Office Phone # Fax #    Ana Lilia Garland [phone]       [facility] [address]  [place] [address]        Equal Access to Services     AVELINOSAFIA VIPIN : Hadii michael ku poo Sodonaali, waaxda luqadaha, qaybta kaalmada adeegyada, waxjames main emil concepcion laJaimiemahi bill. So [facility] [phone].    ATENCIÓN: Si habla español, tiene a starr disposición servicios gratuitos de asistencia lingüística. Llame al [phone].    We comply with applicable federal civil rights laws and [place] laws. We do not discriminate on the basis of race, color, national origin, age, disability sex, sexual orientation or gender identity.            Thank you!     Thank you for choosing [facility]  for your care. Our goal is always to provide you with excellent care. Hearing back from our patients is one way we can continue to improve our services. Please take a few minutes to complete the written survey that you may receive in the mail after your visit with us. Thank you!             Your Updated Medication List - Protect others around you: Learn how to safely use, store and throw away your medicines at www.disposemymeds.org.          This list is accurate as of: 8/28/17 10:20 AM.  Always use your most recent med list.                   Brand Name Dispense Instructions for use Diagnosis    norethindrone-ethinyl estradiol 1.5-30 MG-MCG per tablet    JUNEL 1.5/30    63 tablet    Take 1 tablet by mouth daily    Irregular menstrual cycle, Encounter for contraceptive management, unspecified contraceptive encounter type       valACYclovir 500 MG tablet    VALTREX    6 tablet    TAKE 1 TABLET BY MOUTH TWICE DAILY    Oral herpes

## 2017-08-28 NOTE — PATIENT INSTRUCTIONS
Will call with xray results.  Will set up with physical therapy. Continue ice/heat, Tylenol/Ibuprofen.  Follow up if not resolving.

## 2017-08-28 NOTE — NURSING NOTE
"Chief Complaint   Patient presents with     Back Pain     mid back       Initial /68 (BP Location: Left arm, Patient Position: Sitting, Cuff Size: Adult Regular)  Pulse 86  Temp 98.6  F (37  C) (Tympanic)  Ht 5' 5\" (1.651 m)  Wt 125 lb (56.7 kg)  SpO2 98%  BMI 20.8 kg/m2 Estimated body mass index is 20.8 kg/(m^2) as calculated from the following:    Height as of this encounter: 5' 5\" (1.651 m).    Weight as of this encounter: 125 lb (56.7 kg).  Medication Reconciliation: complete   Delma Prince LPN    "

## 2017-08-28 NOTE — PROGRESS NOTES
SUBJECTIVE:  Nerissa is a 21 year old female who comes in today for back pain.  Reports being at a friends, having had some alcohol, and waking up the following morning with some back pain.  It is midline, mid back, and at times feels it radiates to tailbone and hips.  No numbness, tingling, weakness.  No change in bowel bladder.  No fever, night sweats, weight loss, nausea, vomiting.  No urinary or vaginal symptoms.  Using occasional Ibuprofen and ice.  Works at [employer] and does do some lifting.  She has seen chiropractor, Dr. Calderon, one visit 7/24.  Did not make a difference.    Current Outpatient Prescriptions   Medication     norethindrone-ethinyl estradiol (JUNEL 1.5/30) 1.5-30 MG-MCG per tablet     valACYclovir (VALTREX) 500 MG tablet     No current facility-administered medications for this visit.         Allergies   Allergen Reactions     Seasonal Allergies        Past Medical History:   Diagnosis Date     Anxiety 10/22/2015     Herpes simplex without mention of complication 2/26/2015     History reviewed. No pertinent surgical history.  Social History     Social History     Marital status: Single     Spouse name: N/A     Number of children: N/A     Years of education: N/A     Occupational History     Not on file.     Social History Main Topics     Smoking status: Never Smoker     Smokeless tobacco: Never Used     Alcohol use No     Drug use: No     Sexual activity: Yes     Partners: Male     Other Topics Concern      Service No     Blood Transfusions Yes     Caffeine Concern No     Occupational Exposure No     Hobby Hazards No     Sleep Concern No     Stress Concern No     Weight Concern No     Special Diet No     Back Care No     Exercise No     Seat Belt Yes     Self-Exams No     education given     Parent/Sibling W/ Cabg, Mi Or Angioplasty Before 65f 55m? No     Social History Narrative       ROS:  General: negative for, fever, chills, night sweats, unplanned weight loss  Skin: negative  "for, rash, bruising, lumps or bumps  GI: negative for, poor appetite, nausea, vomiting, abdominal pain, melena, hematochezia, constipation and diarrhea  : negative for, dysuria, frequency, urgency, hematuria, vaginal discharge and dysparunia  Musculoskeletal: positive for as above and back pain, negative for and muscular weakness  Neurologic: negative for, local weakness, numbness or tingling of hands and numbness or tingling of feet      OBJECTIVE:  Vitals:    08/28/17 0943   BP: 104/68   BP Location: Left arm   Patient Position: Sitting   Cuff Size: Adult Regular   Pulse: 86   Temp: 98.6  F (37  C)   TempSrc: Tympanic   SpO2: 98%   Weight: 125 lb (56.7 kg)   Height: 5' 5\" (1.651 m)     GENERAL APPEARANCE: healthy, alert and no distress  MS: extremities normal- no gross deformities noted, gait normal, normal muscle tone, peripheral pulses normal and tender to palpation lower thoracic spine - midline; negative SLR and cross leg testing bilaterally; able to toe/heel walk; full forward fold; reflexes symmetric at achilles and patella  SKIN: no suspicious lesions or rashes  NEURO: Normal strength and tone, sensory exam grossly normal, mentation intact and speech normal  PSYCH: mentation appears normal. and affect normal/bright    ASSESSMENT/ORDERS:    ICD-10-CM    1. Acute midline thoracic back pain M54.6 XR Thoracic Spine 2 Views     XR Thoracic Spine 2 Views     PHYSICAL THERAPY REFERRAL     XR Thoracic Spine 2 Views (Clinic Performed)     XR Thoracic Spine 2 Views (Clinic Performed)     PLAN:  Given midline bone tenderness, did order xray.    No B symptoms.    Will set up with physical therapy. Continue ice/heat, Tylenol/Ibuprofen.  Follow up if not resolving.    Patient Instructions   Will set up with physical therapy. Continue ice/heat, Tylenol/Ibuprofen.  Follow up if not resolving.        Ana Lilia Loera    "

## 2017-08-29 ASSESSMENT — ANXIETY QUESTIONNAIRES: GAD7 TOTAL SCORE: 0

## 2017-10-02 ENCOUNTER — HOSPITAL ENCOUNTER (OUTPATIENT)
Dept: PHYSICAL THERAPY | Facility: HOSPITAL | Age: 22
Setting detail: THERAPIES SERIES
End: 2017-10-02
Attending: FAMILY MEDICINE
Payer: COMMERCIAL

## 2017-10-02 PROCEDURE — 97140 MANUAL THERAPY 1/> REGIONS: CPT | Mod: GP

## 2017-10-02 PROCEDURE — 40000718 ZZHC STATISTIC PT DEPARTMENT ORTHO VISIT

## 2017-10-02 PROCEDURE — 97161 PT EVAL LOW COMPLEX 20 MIN: CPT | Mod: GP

## 2017-10-02 PROCEDURE — 97110 THERAPEUTIC EXERCISES: CPT | Mod: GP

## 2017-10-02 NOTE — PROGRESS NOTES
10/02/17 1538   General Information   Type of Visit Initial OP Ortho PT Evaluation   Start of Care Date 10/02/17   Referring Physician Dr. Garland   Patient/Family Goals Statement to be able to lift, work and be active without pain   Orders Evaluate and Treat   Date of Order 08/28/17   Insurance Type Other  ([employer])   Insurance Comments/Visits Authorized No limits   Medical Diagnosis acute midline thoracic back pain   Surgical/Medical history reviewed Yes   Precautions/Limitations no known precautions/limitations   General Information Comments PMH: non-significant   Body Part(s)   Body Part(s) Lumbar Spine/SI   Presentation and Etiology   Pertinent history of current problem (include personal factors and/or comorbidities that impact the POC) Patient states mid August was onset of symptoms. Patient slept at friends house and noted when she woke pain and region of tenderness in the middle of spine. She felt a bump and felt locked. Patient saw chiropractor. Did not change symptoms. Patient lifts boxes at work at [employer]. Patient is also  at [employer] and performs bending and lifting there as well. Has been limiting lifting the last several weeks and this decreases aggravation. Patient has never had a back problem in past. Patient also notes it when she is sitting in car for long period of time symptoms increase. No issues with sleep. Patient states that occasionally pain down to SI and to tailbone region, but no symptoms into legs. Denies red flags.    Impairments A. Pain;E. Decreased flexibility   Functional Limitations perform activities of daily living;perform required work activities;perform desired leisure / sports activities   Symptom Location TL junction in thoracolumbar region   How/Where did it occur From insidious onset   Onset date of current episode/exacerbation (mid August)   Chronicity New   Pain rating (0-10 point scale) Best (/10);Worst (/10)   Best (/10) 1   Worst (/10) 7   Pain  quality A. Sharp;C. Aching;F. Stabbing   Frequency of pain/symptoms C. With activity   Pain/symptoms exacerbated by C. Lifting;A. Sitting;D. Carrying;I. Bending;L. Work tasks   Pain/symptoms eased by C. Rest;A. Sitting;H. Cold   Progression of symptoms since onset: Improved   Current / Previous Interventions   Diagnostic Tests: X-ray   X-ray Results unremarkable   Prior Level of Function   Prior Level of Function-Mobility Prior to onset, patient do not have back pain   Current Level of Function   Patient role/employment history A. Employed   Employment Comments [employer] and  at [employer]   Fall Risk Screen   Fall screen completed by PT   Per patient - Fall 2 or more times in past year? No   Per patient - Fall with injury in past year? No   Is patient a fall risk? No   Lumbar Spine/SI Objective Findings   Observation No acute distress   Posture No significant dysfunction   Gait/Locomotion Non-antalgic   Flexion %   Extension ROM 75% limited by pain   Right Side Bending %   Left Side Bending %   Lumbar ROM Comment R rotation 100%, L rotation 100% with pain   Lumbar/Hip/Knee/Foot Strength Comments Functional strength appreciated. Able to heel and tow walk. FUnctional squat assessment reveals good strength with no discrepancy between R and L LEs.    Lumbar/SI Special Tests Comments Assessed for somatic dysfunction and appreciated FRS L at T11-T12 and FRS R T12-L1.    Palpation Tenderness with PA glides to T11 and T12. Mild tone at L paraspinals.    Planned Therapy Interventions   Planned Therapy Interventions manual therapy;strengthening;stretching;ROM  (body mechanics training)   Planned Therapy Interventions Comment ther ex, mnaul techniques, body mechanics training. HEP development   Clinical Impression   Criteria for Skilled Therapeutic Interventions Met yes, treatment indicated   PT Diagnosis Patient presents with recent onset of low thoracic pain with prescence of somatic dysfunction  and limited extension.    Influenced by the following impairments Pain, muscle tension, somatic dysfunction   Functional limitations due to impairments Pain with prolonged sitting, limited tolerance for bedning and lifting   Clinical Presentation Stable/Uncomplicated   Clinical Presentation Rationale Clinical judgement   Clinical Decision Making (Complexity) Low complexity   Therapy Frequency (1-2x/week)   Predicted Duration of Therapy Intervention (days/wks) 6 weeks  (prn)   Risk & Benefits of therapy have been explained Yes   Patient, Family & other staff in agreement with plan of care Yes   Clinical Impression Comments Patient presents with isolated somatic dysfunction at TL junction and otherwise healthy tissue and mobility. Patient would benefit from PT services to restore mobility, decrease pain and body mechanics training to prevent future exacaerbation.    Education Assessment   Barriers to Learning No barriers   ORTHO GOALS   PT Ortho Eval Goals 1;2;3   Ortho Goal 1   Goal Identifier STG 1   Goal Description Patient will be independent and compliant with HEP.    Target Date 10/09/17   Ortho Goal 2   Goal Identifier LTG 1   Goal Description Patient will display full range throracolumbar mobility pain free allowing patient to perform bending and twisting activities without issue.    Target Date 11/13/17   Ortho Goal 3   Goal Identifier LTG 2   Goal Description Patient will be able to perform lifting and carrying tasks without limitation from pain or tension.    Target Date 11/13/17   Total Evaluation Time   Total Evaluation Time 20   I certify the need for these services furnished under this plan of treatment and while under my care. (Physician co-signature of this document indicates review and certification of the therapy plan).

## 2017-10-04 NOTE — PROGRESS NOTES
10/04/17 0700   Oswestry Disability Index (SPEEDY   Ottoniel Bro 1980 Version 2.1a, All rights reserved)   Section 1 - Pain intensity 0   Section 2 - Personal care (washing, dressing, etc.)  0   Section 3 - Lifting 1   Section 4 - Walking 0   Section 5 - Sitting 0   Section 6 - Standing 0   Section 7 - Sleeping 0   Section 8 - Sex life (if applicable) 0   Section 9 - Social life 0   Section 10 - Traveling 1   Sum 2   Count 10   Oswestry Score (%) 4 %

## 2017-10-04 NOTE — PROGRESS NOTES
10/04/17 0700   The [facility] STarT Back Screening Tool (  [employer] 01/08/07, Funded by [employer]) Used with permission   1  My back pain has spread down my leg(s) at some time in the last 2 weeks 0   2  I have had pain in the shoulder or neck at some time in the last 2 weeks 0   3  I have only walked short distances because of my back pain 0   4  In the last 2 weeks, I have dressed more slowly than usual because of back pain 0   5  It s not really safe for a person with a condition like mine to be physically active 0   6  Worrying thoughts have been going through my mind a lot of the time 0   7  I feel that my back pain is terrible and it s never going to get any better 0   8  In general I have not enjoyed all the things I used to enjoy 0   9.  Overall, how bothersome has your back pain been in the last 2 weeks? 0.00   The [employer] STarT Back Tool Scores    Sub-Score (Q5-9) 0   Total Score (all 9) 0   Risk: LOW

## 2017-11-20 DIAGNOSIS — Z30.9 ENCOUNTER FOR CONTRACEPTIVE MANAGEMENT: ICD-10-CM

## 2017-11-20 DIAGNOSIS — N92.6 IRREGULAR MENSTRUAL CYCLE: ICD-10-CM

## 2017-11-21 RX ORDER — NORETHINDRONE ACETATE AND ETHINYL ESTRADIOL 1.5; 3 MG/1; UG/1
TABLET ORAL
Qty: 21 TABLET | Refills: 0 | Status: SHIPPED | OUTPATIENT
Start: 2017-11-21 | End: 2017-12-29

## 2017-12-17 ENCOUNTER — HEALTH MAINTENANCE LETTER (OUTPATIENT)
Age: 22
End: 2017-12-17

## 2017-12-29 DIAGNOSIS — N92.6 IRREGULAR MENSTRUAL CYCLE: ICD-10-CM

## 2017-12-29 DIAGNOSIS — Z30.9 ENCOUNTER FOR CONTRACEPTIVE MANAGEMENT: ICD-10-CM

## 2017-12-29 RX ORDER — NORETHINDRONE ACETATE AND ETHINYL ESTRADIOL 1.5; 3 MG/1; UG/1
TABLET ORAL
Qty: 21 TABLET | Refills: 3 | Status: SHIPPED | OUTPATIENT
Start: 2017-12-29 | End: 2018-03-23

## 2018-01-03 DIAGNOSIS — B00.2 ORAL HERPES: ICD-10-CM

## 2018-01-03 RX ORDER — VALACYCLOVIR HYDROCHLORIDE 500 MG/1
500 TABLET, FILM COATED ORAL 2 TIMES DAILY
Qty: 6 TABLET | Refills: 2 | Status: SHIPPED | OUTPATIENT
Start: 2018-01-03 | End: 2021-01-20

## 2018-01-03 NOTE — TELEPHONE ENCOUNTER
8:57 AM    Reason for Call: Phone Call    Description: Nerissa is wondering if she could get her Valtrex refilled . Please call Nerissa    Was an appointment offered for this call?   No    Preferred method for responding to this message: [phone]    If we cannot reach you directly, may we leave a detailed response at the number you provided?  Yes      Janie Willams

## 2018-03-23 DIAGNOSIS — Z30.9 ENCOUNTER FOR CONTRACEPTIVE MANAGEMENT: ICD-10-CM

## 2018-03-23 DIAGNOSIS — N92.6 IRREGULAR MENSTRUAL CYCLE: ICD-10-CM

## 2018-03-26 ENCOUNTER — TELEPHONE (OUTPATIENT)
Dept: FAMILY MEDICINE | Facility: OTHER | Age: 23
End: 2018-03-26

## 2018-03-26 NOTE — TELEPHONE ENCOUNTER
2:45 PM    Reason for Call: Phone Call    Description: Pt would like nurse to call back. Patient initially wanted to speak with the nurse regarding a prescription refill. Advised we did receive the request on Friday afternoon and can take up to 72 business hours for processing and that Saturday and Sunday did not count in the timeline. Advised that it needs to go through the normal process and the nurse would not be able to complete it any faster. Pt then said she still needs to speak with the nurse, but did not say what it is regarding.    Was an appointment offered for this call? No  If yes : Appointment type              Date    Preferred method for responding to this message: Telephone Call  What is your phone number ? [phone]    If we cannot reach you directly, may we leave a detailed response at the number you provided? Yes    Can this message wait until your PCP/provider returns, if available today? Not applicable, provider is in today    Marline Smith

## 2018-03-26 NOTE — TELEPHONE ENCOUNTER
JUNEL 1.5-30 TABLET    Last Written Prescription Date:  12/29/2017  Last Fill Quantity: 21,   # refills: 3  Last Office Visit: 8/28/2017  Future Office visit:

## 2018-03-27 RX ORDER — NORETHINDRONE ACETATE AND ETHINYL ESTRADIOL 1.5; 3 MG/1; UG/1
TABLET ORAL
Qty: 21 TABLET | Refills: 4 | Status: SHIPPED | OUTPATIENT
Start: 2018-03-27 | End: 2018-07-12

## 2018-07-12 ENCOUNTER — TELEPHONE (OUTPATIENT)
Dept: FAMILY MEDICINE | Facility: OTHER | Age: 23
End: 2018-07-12

## 2018-07-12 DIAGNOSIS — Z30.9 ENCOUNTER FOR CONTRACEPTIVE MANAGEMENT: ICD-10-CM

## 2018-07-12 DIAGNOSIS — N92.6 IRREGULAR MENSTRUAL CYCLE: ICD-10-CM

## 2018-07-12 NOTE — TELEPHONE ENCOUNTER
Can you verify correct patient?  Perhaps wrong chart?  There are no encounters of her here before.

## 2018-07-12 NOTE — TELEPHONE ENCOUNTER
No previous appointments listed in EPIC. No birthcontrol listed on patient's current medication list.  Called patient. She states she is a patient of Dr. Loera.  Please advise.

## 2018-07-12 NOTE — TELEPHONE ENCOUNTER
Reason for call:  Medication    1. Medication Name? Birth control  2. Is this request for a refill? Yes  3. What Pharmacy do you use?  4. Have you contacted your pharmacy? No    5. If yes, when?  (Please note that the turn-around-time for prescriptions is 72 business hours; I am sending your request at this time. SEND TO  [facility] Refill Pool  )  Description: PT called and stated she needed a refill on her birth control and needed the nurse to refill.  Was an appointment offered for this a call? No   Preferred method for responding to this messageTelephone Call [phone]  If we cannot reach you directly, may we leave a detailed response at the number you provided? Yes  Can this message wait until your PCP/Provider returns if not available today? Yes

## 2018-07-13 RX ORDER — NORETHINDRONE ACETATE AND ETHINYL ESTRADIOL 1.5; 3 MG/1; UG/1
TABLET ORAL
Qty: 21 TABLET | Refills: 1 | Status: SHIPPED | OUTPATIENT
Start: 2018-07-13 | End: 2020-10-02

## 2018-07-13 NOTE — TELEPHONE ENCOUNTER
Admitting is going to route information to HIM to merge two charts. I am going into MRN [id number] to completed refill request.

## 2019-07-09 ENCOUNTER — HOSPITAL ENCOUNTER (EMERGENCY)
Facility: HOSPITAL | Age: 24
Discharge: HOME | End: 2019-07-09
Attending: PHYSICIAN ASSISTANT | Admitting: PHYSICIAN ASSISTANT
Payer: COMMERCIAL

## 2019-07-09 VITALS
TEMPERATURE: 97.6 F | RESPIRATION RATE: 18 BRPM | OXYGEN SATURATION: 96 % | SYSTOLIC BLOOD PRESSURE: 118 MMHG | DIASTOLIC BLOOD PRESSURE: 65 MMHG

## 2019-07-09 DIAGNOSIS — R30.0 DYSURIA: Primary | ICD-10-CM

## 2019-07-09 LAB
ALBUMIN UR-MCNC: NEGATIVE MG/DL
APPEARANCE UR: CLEAR
BACTERIA #/AREA URNS HPF: ABNORMAL /HPF
BILIRUB UR QL STRIP: NEGATIVE
COLOR UR AUTO: ABNORMAL
GLUCOSE UR STRIP-MCNC: NEGATIVE MG/DL
HGB UR QL STRIP: NEGATIVE
KETONES UR STRIP-MCNC: NEGATIVE MG/DL
LEUKOCYTE ESTERASE UR QL STRIP: NEGATIVE
MUCOUS THREADS #/AREA URNS LPF: PRESENT /LPF
NITRATE UR QL: NEGATIVE
PH UR STRIP: 7.5 PH (ref 4.7–8)
RBC #/AREA URNS AUTO: <1 /HPF (ref 0–2)
SOURCE: ABNORMAL
SP GR UR STRIP: 1.01 (ref 1–1.03)
SQUAMOUS #/AREA URNS AUTO: 3 /HPF (ref 0–1)
UROBILINOGEN UR STRIP-MCNC: NORMAL MG/DL (ref 0–2)
WBC #/AREA URNS AUTO: 1 /HPF (ref 0–5)

## 2019-07-09 PROCEDURE — 81001 URINALYSIS AUTO W/SCOPE: CPT | Performed by: FAMILY MEDICINE

## 2019-07-09 PROCEDURE — G0463 HOSPITAL OUTPT CLINIC VISIT: HCPCS

## 2019-07-09 PROCEDURE — 99213 OFFICE O/P EST LOW 20 MIN: CPT | Mod: Z6 | Performed by: PHYSICIAN ASSISTANT

## 2019-07-09 PROCEDURE — 87086 URINE CULTURE/COLONY COUNT: CPT | Performed by: PHYSICIAN ASSISTANT

## 2019-07-09 RX ORDER — NITROFURANTOIN 25; 75 MG/1; MG/1
100 CAPSULE ORAL 2 TIMES DAILY
Qty: 10 CAPSULE | Refills: 0 | Status: SHIPPED | OUTPATIENT
Start: 2019-07-09 | End: 2019-07-14

## 2019-07-09 ASSESSMENT — ENCOUNTER SYMPTOMS
DIFFICULTY URINATING: 0
DYSURIA: 1
FREQUENCY: 1
DIARRHEA: 0
NAUSEA: 0
HEMATURIA: 0
FEVER: 0
FLANK PAIN: 0
BACK PAIN: 0
VOMITING: 0

## 2019-07-09 NOTE — ED TRIAGE NOTES
Patient arrives by self for evaluation of dysuria, onset 7/4. Patient missed work on 7/4 and is requesting a work note as her employer is requesting one due to missing work.

## 2019-07-09 NOTE — ED AVS SNAPSHOT
[facility]  [address] [address]  Phone:  [phone]                                    Nerissa Roe   MRN: [id number]    Department:  [facility]   Date of Visit:  7/9/2019           After Visit Summary Signature Page    I have received my discharge instructions, and my questions have been answered. I have discussed any challenges I see with this plan with the nurse or doctor.    ..........................................................................................................................................  Patient/Patient Representative Signature      ..........................................................................................................................................  Patient Representative Print Name and Relationship to Patient    ..................................................               ................................................  Date                                   Time    ..........................................................................................................................................  Reviewed by Signature/Title    ...................................................              ..............................................  Date                                               Time          22EPIC Rev 08/18

## 2019-07-09 NOTE — ED PROVIDER NOTES
History     Chief Complaint   Patient presents with     Rule out Urinary Tract Infection     Letter for School/Work     HPI  Nerissa Roe is a 23 year old female who presents to urgent care for evaluation of UTI.  Patient states that she started developing dysuria, increased frequency, urgency on 7/3/2019.  She states that she missed work on 7/4/2019 and needs a work note.  Patient denies any nausea, vomiting, diarrhea, abdominal pain, fevers, CVA tenderness, or any other associated symptoms.  Patient states that she took Advil over-the-counter for pain.    Allergies:  Allergies   Allergen Reactions     Seasonal Allergies        Problem List:    Patient Active Problem List    Diagnosis Date Noted     ACP (advance care planning) 01/05/2017     Priority: Medium     Advance Care Planning 1/5/2017: ACP Review of Chart / Resources Provided:  Reviewed chart for advance care plan.  Nerissa Roe has been provided information and resources to begin or update their advance care plan.  Added by Tatiana Field             Anxiety 10/22/2015     Priority: Medium     Herpes simplex virus (HSV) infection 02/26/2015     Priority: Medium     Problem list name updated by automated process. Provider to review          Past Medical History:    Past Medical History:   Diagnosis Date     Anxiety 10/22/2015     Herpes simplex without mention of complication 2/26/2015       Past Surgical History:    No past surgical history on file.    Family History:    Family History   Problem Relation Age of Onset     Cancer Paternal Grandmother         breast cancer     Breast Cancer Paternal Grandmother      Myocardial Infarction Paternal Grandfather      Diabetes Other         family h/o     Heart Disease Other         heart disease - family h/o     Hypertension Other         family h/o     Osteoporosis No family hx of        Social History:  Marital Status:  Single [1]  Social History     Tobacco Use     Smoking status: Never Smoker      Smokeless tobacco: Never Used   Substance Use Topics     Alcohol use: No     Alcohol/week: 0.0 oz     Drug use: No        Medications:      JUNEL 1.5/30 1.5-30 MG-MCG per tablet   nitroFURantoin macrocrystal-monohydrate (MACROBID) 100 MG capsule   valACYclovir (VALTREX) 500 MG tablet         Review of Systems   Constitutional: Negative for fever.   Gastrointestinal: Negative for diarrhea, nausea and vomiting.   Genitourinary: Positive for dysuria, frequency and urgency. Negative for difficulty urinating, flank pain, hematuria, pelvic pain and vaginal discharge.   Musculoskeletal: Negative for back pain.       Physical Exam   BP: 118/65  Heart Rate: 73  Temp: 97.6  F (36.4  C)  Resp: 18  SpO2: 96 %      Physical Exam   Constitutional: She is oriented to person, place, and time. She appears well-developed and well-nourished. No distress.   HENT:   Head: Normocephalic.   Cardiovascular: Normal rate, regular rhythm and normal heart sounds.   Pulmonary/Chest: Effort normal and breath sounds normal. No respiratory distress.   Abdominal: There is no tenderness. There is no CVA tenderness.   Neurological: She is alert and oriented to person, place, and time.   Nursing note and vitals reviewed.      ED Course        Procedures              Critical Care time:               Results for orders placed or performed during the hospital encounter of 07/09/19 (from the past 24 hour(s))   UA with Microscopic   Result Value Ref Range    Color Urine Light Yellow     Appearance Urine Clear     Glucose Urine Negative NEG^Negative mg/dL    Bilirubin Urine Negative NEG^Negative    Ketones Urine Negative NEG^Negative mg/dL    Specific Gravity Urine 1.015 1.003 - 1.035    Blood Urine Negative NEG^Negative    pH Urine 7.5 4.7 - 8.0 pH    Protein Albumin Urine Negative NEG^Negative mg/dL    Urobilinogen mg/dL Normal 0.0 - 2.0 mg/dL    Nitrite Urine Negative NEG^Negative    Leukocyte Esterase Urine Negative NEG^Negative    Source Midstream  Urine     WBC Urine 1 0 - 5 /HPF    RBC Urine <1 0 - 2 /HPF    Bacteria Urine Few (A) NEG^Negative /HPF    Squamous Epithelial /HPF Urine 3 (H) 0 - 1 /HPF    Mucous Urine Present (A) NEG^Negative /LPF       Medications - No data to display    Assessments & Plan (with Medical Decision Making)   #1.  Dysuria    Discussed exam findings with patient.  Patient will be started on Macrobid 100 mg twice daily x5 days.  Patient has a urine culture pending and will be notified of any needed change in antibiotic.  Supportive cares are discussed with patient.  If symptoms do not improve over the next 3 to 5 days please return to urgent care or follow-up with primary care provider.  Patient verbalizes understanding and agreement of plan.  Patient was given work note excusing her on 7/4/2019.        I have reviewed the nursing notes.    I have reviewed the findings, diagnosis, plan and need for follow up with the patient.         Medication List      Started    nitroFURantoin macrocrystal-monohydrate 100 MG capsule  Commonly known as:  MACROBID  100 mg, Oral, 2 TIMES DAILY            Final diagnoses:   Dysuria       7/9/2019   [facility]     Cortes Hi PA-C  07/09/19 0965

## 2019-07-11 LAB
BACTERIA SPEC CULT: NORMAL
SPECIMEN SOURCE: NORMAL

## 2020-01-20 ENCOUNTER — HOSPITAL ENCOUNTER (EMERGENCY)
Facility: HOSPITAL | Age: 25
Discharge: HOME | End: 2020-01-20
Attending: NURSE PRACTITIONER | Admitting: NURSE PRACTITIONER
Payer: COMMERCIAL

## 2020-01-20 VITALS
DIASTOLIC BLOOD PRESSURE: 71 MMHG | SYSTOLIC BLOOD PRESSURE: 112 MMHG | TEMPERATURE: 98.1 F | RESPIRATION RATE: 16 BRPM | BODY MASS INDEX: 21.15 KG/M2 | OXYGEN SATURATION: 99 % | WEIGHT: 127.09 LBS

## 2020-01-20 DIAGNOSIS — J01.00 SUBACUTE MAXILLARY SINUSITIS: ICD-10-CM

## 2020-01-20 PROCEDURE — G0463 HOSPITAL OUTPT CLINIC VISIT: HCPCS

## 2020-01-20 PROCEDURE — 99213 OFFICE O/P EST LOW 20 MIN: CPT | Mod: Z6 | Performed by: NURSE PRACTITIONER

## 2020-01-20 RX ORDER — AZITHROMYCIN 250 MG/1
TABLET, FILM COATED ORAL
Qty: 6 TABLET | Refills: 0 | Status: SHIPPED | OUTPATIENT
Start: 2020-01-20 | End: 2020-01-25

## 2020-01-20 ASSESSMENT — ENCOUNTER SYMPTOMS
COUGH: 1
SORE THROAT: 0
SHORTNESS OF BREATH: 0
MYALGIAS: 0
DIARRHEA: 0
NAUSEA: 0
SINUS PRESSURE: 1
VOMITING: 0
DIFFICULTY URINATING: 0
RHINORRHEA: 1
PALPITATIONS: 0
ARTHRALGIAS: 0
HEADACHES: 0
FATIGUE: 1
FEVER: 0
EYES NEGATIVE: 1
ABDOMINAL PAIN: 0
PSYCHIATRIC NEGATIVE: 1

## 2020-01-20 NOTE — ED TRIAGE NOTES
Pt presents today with c/o head congestions and chest congestions. Productive coughing. Denies fevers, Staying the same, has not gotten worse. .

## 2020-01-20 NOTE — ED AVS SNAPSHOT
[facility]  [address] [address]  Phone:  [phone]                                    Nerissa Roe   MRN: [id number]    Department:  [facility]   Date of Visit:  1/20/2020           After Visit Summary Signature Page    I have received my discharge instructions, and my questions have been answered. I have discussed any challenges I see with this plan with the nurse or doctor.    ..........................................................................................................................................  Patient/Patient Representative Signature      ..........................................................................................................................................  Patient Representative Print Name and Relationship to Patient    ..................................................               ................................................  Date                                   Time    ..........................................................................................................................................  Reviewed by Signature/Title    ...................................................              ..............................................  Date                                               Time          22EPIC Rev 08/18

## 2020-01-20 NOTE — ED PROVIDER NOTES
History     Chief Complaint   Patient presents with     URI     HPI  Nerissa Roe is a 24 year old female who  With 9 days of sinus pain, Lime green sputum,  Cough, worse at night. + postnasal drip.  No fever,   HA, Sore throat, ear pain, SOB. Nausea, or vomiting.       Allergies:  Allergies   Allergen Reactions     Seasonal Allergies        Problem List:    Patient Active Problem List    Diagnosis Date Noted     ACP (advance care planning) 01/05/2017     Priority: Medium     Advance Care Planning 1/5/2017: ACP Review of Chart / Resources Provided:  Reviewed chart for advance care plan.  Nerissa Roe has been provided information and resources to begin or update their advance care plan.  Added by Tatiana Field             Anxiety 10/22/2015     Priority: Medium     Herpes simplex virus (HSV) infection 02/26/2015     Priority: Medium     Problem list name updated by automated process. Provider to review          Past Medical History:    Past Medical History:   Diagnosis Date     Anxiety 10/22/2015     Herpes simplex without mention of complication 2/26/2015       Past Surgical History:    No past surgical history on file.    Family History:    Family History   Problem Relation Age of Onset     Cancer Paternal Grandmother         breast cancer     Breast Cancer Paternal Grandmother      Myocardial Infarction Paternal Grandfather      Diabetes Other         family h/o     Heart Disease Other         heart disease - family h/o     Hypertension Other         family h/o     Osteoporosis No family hx of        Social History:  Marital Status:  Single [1]  Social History     Tobacco Use     Smoking status: Never Smoker     Smokeless tobacco: Never Used   Substance Use Topics     Alcohol use: No     Alcohol/week: 0.0 standard drinks     Drug use: No        Medications:    azithromycin (ZITHROMAX Z-VIKY) 250 MG tablet  JUNEL 1.5/30 1.5-30 MG-MCG per tablet  valACYclovir (VALTREX) 500 MG tablet          Review of  Systems   Constitutional: Positive for fatigue. Negative for fever.   HENT: Positive for congestion, postnasal drip, rhinorrhea and sinus pressure. Negative for ear pain and sore throat.    Eyes: Negative.    Respiratory: Positive for cough. Negative for shortness of breath.    Cardiovascular: Negative for chest pain and palpitations.   Gastrointestinal: Negative for abdominal pain, diarrhea, nausea and vomiting.   Genitourinary: Negative for difficulty urinating.   Musculoskeletal: Negative for arthralgias and myalgias.   Skin: Negative.  Negative for rash.   Neurological: Negative for headaches.   Psychiatric/Behavioral: Negative.        Physical Exam   BP: 112/71  Heart Rate: 81  Temp: 98.1  F (36.7  C)  Resp: 16  Weight: 57.6 kg (127 lb 1.5 oz)  SpO2: 99 %      Physical Exam  Constitutional:       Appearance: Normal appearance.   HENT:      Right Ear: Tympanic membrane, ear canal and external ear normal.      Left Ear: Tympanic membrane, ear canal and external ear normal.      Nose: Congestion present.      Comments: Nares red, swollen.       Mouth/Throat:      Mouth: Mucous membranes are moist.      Pharynx: Oropharynx is clear.   Eyes:      Extraocular Movements: Extraocular movements intact.      Conjunctiva/sclera: Conjunctivae normal.      Pupils: Pupils are equal, round, and reactive to light.   Neck:      Musculoskeletal: Normal range of motion and neck supple.   Cardiovascular:      Rate and Rhythm: Normal rate and regular rhythm.      Heart sounds: Normal heart sounds. No murmur.   Pulmonary:      Effort: Pulmonary effort is normal.      Breath sounds: Normal breath sounds.   Musculoskeletal: Normal range of motion.   Lymphadenopathy:      Cervical: No cervical adenopathy.   Skin:     General: Skin is warm and dry.   Neurological:      General: No focal deficit present.      Mental Status: She is alert and oriented to person, place, and time.   Psychiatric:         Mood and Affect: Mood normal.          Behavior: Behavior normal.         ED Course        Procedures       No results found for this or any previous visit (from the past 24 hour(s)).    Medications - No data to display    Assessments & Plan (with Medical Decision Making)     I have reviewed the nursing notes.    I have reviewed the findings, diagnosis, plan and need for follow up with the patient.      New Prescriptions    AZITHROMYCIN (ZITHROMAX Z-VIKY) 250 MG TABLET    Two tablets on the first day, then one tablet daily for the next 4 days       Final diagnoses:   Subacute maxillary sinusitis     ASSESSMENT / PLAN:  (J01.00) Subacute maxillary sinusitis  Comment:   Plan: 1. Zpak  500mg today, 250mg a day for 4 days  2. Drink lots of fluids.        1/20/2020   [facility]     Pelon Rivas NP  01/20/20 0702

## 2020-08-02 NOTE — PROGRESS NOTES
Subjective     eNrissa Roe is a 24 year old female who presents to clinic today for the following health issues:    HPI       ALLERGIES      Duration: 6 weeks    Description:   Nasal congestion: YES- facuet this morning; but also stuffy; clear rhinorrhea  Sneezing: YES  Red, itchy eyes: YES- watery too  Ears - itching, throat itching  No dyspnea or wheeze.  Rare cough.  Constant clearing throat.  Dad with seasonal allergies.    Accompanying signs and symptoms: coughing, headaches, ear pain    History (similar episodes/allergy testing): yes but seem to be worsening    Precipitating or alleviating factors: None    Therapies tried and outcome: Claritin last 2 weeks xyzal- non effective; use to be effective    No nasal sprays or sinus rinses.    Zycam    No fevers.    No travel.    Remote allergy testing - dust, molds, pollen.    Of note - overdue for physical, pap.  [facility]    New job - [employer].    Current Outpatient Medications   Medication     fexofenadine (ALLEGRA) 180 MG tablet     fluticasone (FLONASE) 50 MCG/ACT nasal spray     JUNEL 1.5/30 1.5-30 MG-MCG per tablet     valACYclovir (VALTREX) 500 MG tablet     No current facility-administered medications for this visit.        Patient Active Problem List   Diagnosis     Herpes simplex virus (HSV) infection     Anxiety     ACP (advance care planning)     History reviewed. No pertinent surgical history.    Social History     Tobacco Use     Smoking status: Never Smoker     Smokeless tobacco: Never Used   Substance Use Topics     Alcohol use: No     Alcohol/week: 0.0 standard drinks     Family History   Problem Relation Age of Onset     Cancer Paternal Grandmother         breast cancer     Breast Cancer Paternal Grandmother      Myocardial Infarction Paternal Grandfather      Diabetes Other         family h/o     Heart Disease Other         heart disease - family h/o     Hypertension Other         family h/o      "Osteoporosis No family hx of            Reviewed and updated as needed this visit by Provider  Tobacco  Allergies  Meds  Problems  Med Hx  Surg Hx  Fam Hx  Soc Hx          Review of Systems   Constitutional, HEENT, cardiovascular, pulmonary, gi and gu systems are negative, except as otherwise noted.      Objective    /72 (BP Location: Right arm, Patient Position: Sitting, Cuff Size: Adult Regular)   Pulse 76   Temp 98  F (36.7  C) (Tympanic)   Ht 1.626 m (5' 4\")   Wt 61.5 kg (135 lb 9.6 oz)   SpO2 98%   BMI 23.28 kg/m    Body mass index is 23.28 kg/m .  Physical Exam   GENERAL: healthy, alert and no distress  EYES: PERRL, EOMI and conjunctiva mildly injected; slight watering  HENT: normal cephalic/atraumatic, ear canals and TM's normal, nasal mucosa edematous , oropharynx with post nasal drainage, oral mucous membranes moist and sinuses: mild tenderness  NECK: no adenopathy, no asymmetry, masses, or scars and thyroid normal to palpation  RESP: lungs clear to auscultation - no rales, rhonchi or wheezes  CV: regular rate and rhythm, normal S1 S2, no S3 or S4, no murmur, click or rub, no peripheral edema and peripheral pulses strong  MS: no gross musculoskeletal defects noted, no edema  PSYCH: mentation appears normal, affect normal/bright    Diagnostic Test Results:  none         Assessment & Plan       ICD-10-CM    1. Environmental and seasonal allergies  J30.89 fluticasone (FLONASE) 50 MCG/ACT nasal spray     fexofenadine (ALLEGRA) 180 MG tablet        Start Brandin Med sinus rinses twice daily.  1/2 bottle, morning, 1/2 bottle evening.  Continue antihistamine nightly - Claritin/Zyrtec/Allegra.  Could do twice daily if needed.  Start nasal steroid spray Flonase.  Use daily.  Takes time to work.  Eye drops if needed.    Call if not responding.  Consider allergy testing.  Consider adding steroid to sinus rinse.      See Patient Instructions    No follow-ups on file.    Ana Lilia Loera MD  [place] [facility]

## 2020-08-03 ENCOUNTER — OFFICE VISIT (OUTPATIENT)
Dept: FAMILY MEDICINE | Facility: OTHER | Age: 25
End: 2020-08-03
Attending: FAMILY MEDICINE
Payer: COMMERCIAL

## 2020-08-03 VITALS
TEMPERATURE: 98 F | WEIGHT: 135.6 LBS | BODY MASS INDEX: 23.15 KG/M2 | HEIGHT: 64 IN | OXYGEN SATURATION: 98 % | HEART RATE: 76 BPM | DIASTOLIC BLOOD PRESSURE: 72 MMHG | SYSTOLIC BLOOD PRESSURE: 100 MMHG

## 2020-08-03 DIAGNOSIS — J30.89 ENVIRONMENTAL AND SEASONAL ALLERGIES: Primary | ICD-10-CM

## 2020-08-03 PROCEDURE — 99213 OFFICE O/P EST LOW 20 MIN: CPT | Performed by: FAMILY MEDICINE

## 2020-08-03 PROCEDURE — G0463 HOSPITAL OUTPT CLINIC VISIT: HCPCS

## 2020-08-03 RX ORDER — FEXOFENADINE HCL 180 MG/1
180 TABLET ORAL DAILY
Qty: 30 TABLET | Refills: 3 | Status: SHIPPED | OUTPATIENT
Start: 2020-08-03 | End: 2021-08-09

## 2020-08-03 RX ORDER — FLUTICASONE PROPIONATE 50 MCG
1 SPRAY, SUSPENSION (ML) NASAL DAILY
Qty: 16 G | Refills: 3 | Status: SHIPPED | OUTPATIENT
Start: 2020-08-03 | End: 2022-04-13

## 2020-08-03 ASSESSMENT — ANXIETY QUESTIONNAIRES
6. BECOMING EASILY ANNOYED OR IRRITABLE: NOT AT ALL
5. BEING SO RESTLESS THAT IT IS HARD TO SIT STILL: NOT AT ALL
7. FEELING AFRAID AS IF SOMETHING AWFUL MIGHT HAPPEN: NOT AT ALL
GAD7 TOTAL SCORE: 0
IF YOU CHECKED OFF ANY PROBLEMS ON THIS QUESTIONNAIRE, HOW DIFFICULT HAVE THESE PROBLEMS MADE IT FOR YOU TO DO YOUR WORK, TAKE CARE OF THINGS AT HOME, OR GET ALONG WITH OTHER PEOPLE: NOT DIFFICULT AT ALL
4. TROUBLE RELAXING: NOT AT ALL
1. FEELING NERVOUS, ANXIOUS, OR ON EDGE: NOT AT ALL
2. NOT BEING ABLE TO STOP OR CONTROL WORRYING: NOT AT ALL
3. WORRYING TOO MUCH ABOUT DIFFERENT THINGS: NOT AT ALL

## 2020-08-03 ASSESSMENT — MIFFLIN-ST. JEOR: SCORE: 1355.08

## 2020-08-03 ASSESSMENT — PAIN SCALES - GENERAL: PAINLEVEL: NO PAIN (0)

## 2020-08-03 ASSESSMENT — PATIENT HEALTH QUESTIONNAIRE - PHQ9: SUM OF ALL RESPONSES TO PHQ QUESTIONS 1-9: 0

## 2020-08-03 NOTE — PATIENT INSTRUCTIONS
Start Brandin Med sinus rinses twice daily.  1/2 bottle, morning, 1/2 bottle evening.  Continue antihistamine nightly - Claritin/Zyrtec/Allegra.  Could do twice daily if needed.  Start nasal steroid spray Flonase.  Use daily.  Takes time to work.  Eye drops if needed.    Call if not responding.  Consider allergy testing.  Consider adding steroid to sinus rinse.        Patient Education     Allergic Rhinitis  Allergic rhinitis is an allergic reaction that affects the nose, and often the eyes. It s often known as nasal allergies. Nasal allergies are often due to things in the environment that are breathed in. Depending what you are sensitive to, nasal allergies may occur only during certain seasons. Or they may occur year round. Common indoor allergens include house dust mites, mold, cockroaches, and pet dander. Outdoor allergens include pollen from trees, grasses, and weeds.   Symptoms include a drippy, stuffy, and itchy nose. They also include sneezing and red and itchy eyes. You may feel tired more often. Severe allergies may also affect your breathing and trigger a condition called asthma.   Tests can be done to see what allergens are affecting you. You may be referred to an allergy specialist for testing and further evaluation.  Home care  Your healthcare provider may prescribe medicines to help relieve allergy symptoms. These may include oral medicines, nasal sprays, or eye drops.  Ask your provider for advice on how to avoid substances that you are allergic to. Below are a few tips for each type of allergen.  Pet dander:    Do not have pets with fur and feathers.    If you can't avoid having a pet, keep it out of your bedroom and off upholstered furniture.  Pollen:    When pollen counts are high, keep windows of your car and home closed. If possible, use an air conditioner instead.    Wear a filter mask when mowing or doing yard work.  House dust mites:    Wash bedding every week in warm water and detergent and  dry on a hot setting.    Cover the mattress, box spring, and pillows with allergy covers.     If possible, sleep in a room with no carpet, curtains, or upholstered furniture.  Cockroaches:    Store food in sealed containers.    Remove garbage from the home promptly.    Fix water leaks  Mold:    Keep humidity low by using a dehumidifier or air conditioner. Keep the dehumidifier and air conditioner clean and free of mold.    Clean moldy areas with bleach and water.  In general:    Vacuum once or twice a week. If possible, use a vacuum with a high-efficiency particulate air (HEPA) filter.    Do not smoke. Avoid cigarette smoke. Cigarette smoke is an irritant that can make symptoms worse.  Follow-up care  Follow up as advised by the healthcare provider or our staff. If you were referred to an allergy specialist, make this appointment promptly.  When to seek medical advice  Call your healthcare provider right away if the following occur:    Coughing or wheezing    Fever of 100.4 F (38 C) or higher, or as directed by your healthcare provider    Raised red bumps (hives)    Continuing symptoms, new symptoms, or worsening symptoms  Call 911 if you have:    Trouble breathing    Severe swelling of the face or severe itching of the eyes or mouth  Date Last Reviewed: 3/1/2017    2204-7998 [employer]. [address] [place] [address], [place] [address]. All rights reserved. This information is not intended as a substitute for professional medical care. Always follow your healthcare professional's instructions.

## 2020-08-03 NOTE — NURSING NOTE
"Chief Complaint   Patient presents with     Allergies       Initial /72 (BP Location: Right arm, Patient Position: Sitting, Cuff Size: Adult Regular)   Pulse 76   Temp 98  F (36.7  C) (Tympanic)   Ht 1.626 m (5' 4\")   Wt 61.5 kg (135 lb 9.6 oz)   SpO2 98%   BMI 23.28 kg/m   Estimated body mass index is 23.28 kg/m  as calculated from the following:    Height as of this encounter: 1.626 m (5' 4\").    Weight as of this encounter: 61.5 kg (135 lb 9.6 oz).  Medication Reconciliation: complete  Cielo Fair LPN  "

## 2020-08-04 ASSESSMENT — ANXIETY QUESTIONNAIRES: GAD7 TOTAL SCORE: 0

## 2020-10-01 NOTE — PROGRESS NOTES
"Subjective     Nerissa Roe is a 24 year old female who presents to clinic today for the following health issues:    HPI         Concern - contraception  Onset:  Ongoing   Description: quit the Junel 3 months ago . Would like to discuss  a non- hormonal IUD or other option.  Took oral for past 2 years.  Slightly irritable most of the time, mostly last few months, which has resolved when she stopped the OCP.    Depo shot 6-7 years ago for a year or two, 50lb weight gain.    9/2019 - pap negative - [facility].  Sexually active - boyfriend and she took a break recently.  Declines std screening.    Declines flu shot.      Review of Systems   Constitutional, HEENT, cardiovascular, pulmonary, gi and gu systems are negative, except as otherwise noted.      Objective    /72 (BP Location: Right arm, Patient Position: Chair, Cuff Size: Adult Regular)   Pulse 105   Temp 98.4  F (36.9  C) (Tympanic)   Ht 1.626 m (5' 4\")   Wt 60.1 kg (132 lb 6.4 oz)   SpO2 98%   BMI 22.73 kg/m    Body mass index is 22.73 kg/m .  Physical Exam   GENERAL: healthy, alert and no distress  RESP: lungs clear to auscultation - no rales, rhonchi or wheezes  CV: regular rate and rhythm, normal S1 S2, no S3 or S4, no murmur, click or rub, no peripheral edema and peripheral pulses strong  ABDOMEN: soft, nontender, no hepatosplenomegaly, no masses and bowel sounds normal  MS: no gross musculoskeletal defects noted, no edema    Results for orders placed or performed in visit on 10/02/20 (from the past 24 hour(s))   HCG Qual, Urine ([id number])   Result Value Ref Range    HCG Qual Urine Negative NEG^Negative           Assessment & Plan    (Z30.016) Encounter for initial prescription of transdermal patch hormonal contraceptive device  (primary encounter diagnosis)    Plan: HCG Qual, Urine ([id number]),         norelgestromin-ethinyl estradiol (ORTHO EVRA)         150-35 MCG/24HR patch    Counseled on birth control options and " condom use and routine std screening.  [facility]  Prescription sent for Ortho Evra contraceptive. If mood symptoms return, consider either Nuva ring or copper IUD.    I saw and examined this patient.  I have read through the note and made appropriate changes and agree with the PA student, Santos Ortiz, assessment and plan.       Ana Lilia Loera MD  [facility]

## 2020-10-02 ENCOUNTER — OFFICE VISIT (OUTPATIENT)
Dept: FAMILY MEDICINE | Facility: OTHER | Age: 25
End: 2020-10-02
Attending: FAMILY MEDICINE
Payer: COMMERCIAL

## 2020-10-02 VITALS
HEIGHT: 64 IN | TEMPERATURE: 98.4 F | SYSTOLIC BLOOD PRESSURE: 112 MMHG | BODY MASS INDEX: 22.61 KG/M2 | WEIGHT: 132.4 LBS | HEART RATE: 105 BPM | OXYGEN SATURATION: 98 % | DIASTOLIC BLOOD PRESSURE: 72 MMHG

## 2020-10-02 DIAGNOSIS — Z30.016 ENCOUNTER FOR INITIAL PRESCRIPTION OF TRANSDERMAL PATCH HORMONAL CONTRACEPTIVE DEVICE: Primary | ICD-10-CM

## 2020-10-02 LAB — HCG UR QL: NEGATIVE

## 2020-10-02 PROCEDURE — G0463 HOSPITAL OUTPT CLINIC VISIT: HCPCS | Mod: 25

## 2020-10-02 PROCEDURE — 81025 URINE PREGNANCY TEST: CPT | Performed by: FAMILY MEDICINE

## 2020-10-02 PROCEDURE — 99213 OFFICE O/P EST LOW 20 MIN: CPT | Performed by: FAMILY MEDICINE

## 2020-10-02 RX ORDER — NORELGESTROMIN AND ETHINYL ESTRADIOL 35; 150 UG/MG; UG/MG
PATCH TRANSDERMAL
Qty: 3 PATCH | Refills: 11 | Status: SHIPPED | OUTPATIENT
Start: 2020-10-02 | End: 2021-11-16

## 2020-10-02 ASSESSMENT — MIFFLIN-ST. JEOR: SCORE: 1340.56

## 2020-10-02 ASSESSMENT — PAIN SCALES - GENERAL: PAINLEVEL: NO PAIN (0)

## 2020-10-02 NOTE — NURSING NOTE
"Chief Complaint   Patient presents with     Contraception     would like a non hormonal IUD        Initial /72 (BP Location: Right arm, Patient Position: Chair, Cuff Size: Adult Regular)   Pulse 105   Temp 98.4  F (36.9  C) (Tympanic)   Ht 1.626 m (5' 4\")   Wt 60.1 kg (132 lb 6.4 oz)   SpO2 98%   BMI 22.73 kg/m   Estimated body mass index is 22.73 kg/m  as calculated from the following:    Height as of this encounter: 1.626 m (5' 4\").    Weight as of this encounter: 60.1 kg (132 lb 6.4 oz).  Medication Reconciliation: complete  Nereida Stratton LPN  "

## 2020-11-04 ENCOUNTER — NURSE TRIAGE (OUTPATIENT)
Dept: FAMILY MEDICINE | Facility: OTHER | Age: 25
End: 2020-11-04

## 2020-11-04 DIAGNOSIS — Z20.822 COVID-19 RULED OUT: Primary | ICD-10-CM

## 2020-11-04 NOTE — TELEPHONE ENCOUNTER
"Call from patient reporting symptoms of covid 19, no known exposure. Symptoms starting on 11/3/20.     Symptoms include: headache, cough, temp 100.7, chills.    covid testing scheduled:    Next 5 appointments (look out 90 days)    Nov 05, 2020  8:00 AM  (Arrive by 7:45 AM)  SHORT with HC COLLECTION [facility] ([facility] ) [place] [address]  [phone]            Discharge Instructions for COVID-19 Patients  You have--or may have--COVID-19. Please follow the instructions listed below.   If you have a weakened immune system, discuss with your doctor any other actions you need to take.  How can I protect others?  If you have symptoms (fever, cough, body aches or trouble breathing):    Stay home and away from others (self-isolate) until:  ? At least 10 days have passed since your symptoms started (if testing positive) , And   ? You've had no fever--and no medicine that reduces fever--for 1 full day (24 hours), And    ? Your other symptoms have resolved (gotten better).  If you don't show symptoms, but testing showed that you have COVID-19:    Stay home and away from others (self-isolate). Follow the tips under \"How do I self-isolate?\" below for 10 days (20 days if you have a weak immune system).    You don't need to be retested for COVID-19 before going back to school or work. As long as you're fever-free and feeling better, you can go back to school, work and other activities after waiting the 10 or 20 days.   How do I self-isolate?    Stay in your own room, even for meals. Use your own bathroom if you can.    Stay away from others in your home. No hugging, kissing or shaking hands. No visitors.    Don't go to work, school or anywhere else.    Clean \"high touch\" surfaces often (doorknobs, counters, handles). Use household cleaning spray or wipes. You'll find a full list of  on the EPA website: " www.epa.gov/pesticide-registration/list-n-disinfectants-use-against-sars-cov-2.    Cover your mouth and nose with a mask or other face covering to avoid spreading germs.    Wash your hands and face often. Use soap and water.    Caregivers in these groups are at risk for severe illness due to COVID-19:  ? People 65 years and older  ? People who live in a nursing home or long-term care facility  ? People with chronic disease (lung, heart, cancer, diabetes, kidney, liver, immunologic)  ? People who have a weakened immune system, including those who:    Are in cancer treatment    Take medicine that weakens the immune system, such as corticosteroids    Had a bone marrow or organ transplant    Have an immune deficiency    Have poorly controlled HIV or AIDS    Are obese (body mass index of 40 or higher)    Smoke regularly    Caregivers should wear gloves while washing dishes, handling laundry and cleaning bedrooms and bathrooms.    Use caution when washing and drying laundry: Don't shake dirty laundry and use the warmest water setting that you can.    For more tips on managing your health at home, go to www.cdc.gov/coronavirus/2019-ncov/downloads/10Things.pdf.  How can I take care of myself at home?  1. Get lots of rest. Drink extra fluids (unless a doctor has told you not to).    2. Take Tylenol (acetaminophen) for fever or pain. If you have liver or kidney problems, ask your family doctor if it's okay to take Tylenol.     Adults can take either:  ? 650 mg (two 325 mg pills) every 4 to 6 hours, or   ? 1,000 mg (two 500 mg pills) every 8 hours as needed.  ? Note: Don't take more than 3,000 mg in one day. Acetaminophen is found in many medicines (both prescribed and over-the-counter medicines). Read all labels to be sure you don't take too much.   For children, check the Tylenol bottle for the right dose. The dose is based on the child's age or weight.  3. If you have other health problems (like cancer, heart failure, an  organ transplant or severe kidney disease): Call your specialty clinic if you don't feel better in the next 2 days.    4. Know when to call 911. Emergency warning signs include:  ? Trouble breathing or shortness of breath  ? Pain or pressure in the chest that doesn't go away  ? Feeling confused like you haven't felt before, or not being able to wake up  ? Bluish-colored lips or face    5. Your doctor may have prescribed a blood thinner medicine. Follow their instructions.  Where can I get more information?    [facility] - About COVID-19: [email or web address]    CDC - What to Do If You're Sick: www.cdc.gov/coronavirus/2019-ncov/about/steps-when-sick.html    CDC - Ending Home Isolation: www.cdc.gov/coronavirus/2019-ncov/hcp/disposition-in-home-patients.html    CDC - Caring for Someone: www.cdc.gov/coronavirus/2019-ncov/if-you-are-sick/care-for-someone.html    [facility] - Interim Guidance for Hospital Discharge to Home: www.[facility] access hospital.[place]./diseases/coronavirus/hcp/hospdischarge.pdf    [facility] clinical trials (COVID-19 research studies): [email or web address].[facility]-clinical-trials    Below are the COVID-19 hotlines at the [place] Department of Health ([facility]). Interpreters are available.  ? For health questions: Call 585-433-8843 or 1-305.341.6539 (7 a.m. to 7 p.m.)  ? For questions about schools and childcare: Call 936-352-1732 or 1-718.308.7161 (7 a.m. to 7 p.m.)    For informational purposes only. Not to replace the advice of your health care provider. Clinically reviewed by the Infection Prevention Team. Copyright   2020 [employer]. All rights reserved. [employer] [id number] - REV 08/04/20.      Reason for Disposition    COVID-19 Home Isolation, questions about    Additional Information    Negative: SEVERE difficulty breathing (e.g., struggling for each breath, speaks in single words)    Negative: Difficult to awaken or acting confused (e.g., disoriented, slurred speech)     "Negative: Bluish (or gray) lips or face now    Negative: Shock suspected (e.g., cold/pale/clammy skin, too weak to stand, low BP, rapid pulse)    Negative: Sounds like a life-threatening emergency to the triager    Negative: [1] COVID-19 exposure AND [2] no symptoms    Negative: COVID-19 and Breastfeeding, questions about    Negative: [1] Adult with possible COVID-19 symptoms AND [2] triager concerned about severity of symptoms or other causes    Negative: SEVERE or constant chest pain or pressure (Exception: mild central chest pain, present only when coughing)    Negative: MODERATE difficulty breathing (e.g., speaks in phrases, SOB even at rest, pulse 100-120)    Negative: Patient sounds very sick or weak to the triager    Negative: MILD difficulty breathing (e.g., minimal/no SOB at rest, SOB with walking, pulse <100)    Negative: Chest pain or pressure    Negative: Fever > 103 F (39.4 C)    Negative: [1] Fever > 101 F (38.3 C) AND [2] age > 60    Negative: [1] Fever > 100.0 F (37.8 C) AND [2] bedridden (e.g., nursing home patient, CVA, chronic illness, recovering from surgery)    Negative: HIGH RISK patient (e.g., age > 64 years, diabetes, heart or lung disease, weak immune system) (Exception: Has already been evaluated by healthcare provider and has no new or worsening symptoms)    Negative: [1] COVID-19 infection suspected by caller or triager AND [2] mild symptoms (cough, fever, or others) AND [3] no complications or SOB    Negative: Cough present > 3 weeks    Negative: [1] COVID-19 diagnosed by positive lab test AND [2] mild symptoms (e.g., cough, fever, others) AND [3] no complications or SOB    Negative: [1] COVID-19 diagnosed by HCP (doctor, NP or PA) AND [2] mild symptoms (e.g., cough, fever, others) AND [3] no complications or SOB    Answer Assessment - Initial Assessment Questions  1. COVID-19 DIAGNOSIS: \"Who made your Coronavirus (COVID-19) diagnosis?\" \"Was it confirmed by a positive lab test?\" If not " "diagnosed by a HCP, ask \"Are there lots of cases (community spread) where you live?\" (See public health department website, if unsure)      Pt has not been diagnosed with covid 19    2. ONSET: \"When did the COVID-19 symptoms start?\"       11/3/20    3. WORST SYMPTOM: \"What is your worst symptom?\" (e.g., cough, fever, shortness of breath, muscle aches)      Fever and chills    4. COUGH: \"Do you have a cough?\" If so, ask: \"How bad is the cough?\"        Yes (dry)    5. FEVER: \"Do you have a fever?\" If so, ask: \"What is your temperature, how was it measured, and when did it start?\"      Yes, temp 100.7    6. RESPIRATORY STATUS: \"Describe your breathing?\" (e.g., shortness of breath, wheezing, unable to speak)       No     7. BETTER-SAME-WORSE: \"Are you getting better, staying the same or getting worse compared to yesterday?\"  If getting worse, ask, \"In what way?\"      Worse     8. HIGH RISK DISEASE: \"Do you have any chronic medical problems?\" (e.g., asthma, heart or lung disease, weak immune system, etc.)      No     9. PREGNANCY: \"Is there any chance you are pregnant?\" \"When was your last menstrual period?\"      No     10. OTHER SYMPTOMS: \"Do you have any other symptoms?\"  (e.g., chills, fatigue, headache, loss of smell or taste, muscle pain, sore throat)        Headache, chills    Protocols used: CORONAVIRUS (COVID-19) DIAGNOSED OR [id number] 8.4.20      "

## 2020-11-05 ENCOUNTER — OFFICE VISIT (OUTPATIENT)
Dept: FAMILY MEDICINE | Facility: OTHER | Age: 25
End: 2020-11-05
Attending: FAMILY MEDICINE
Payer: COMMERCIAL

## 2020-11-05 DIAGNOSIS — Z20.822 COVID-19 RULED OUT: ICD-10-CM

## 2020-11-05 PROCEDURE — U0003 INFECTIOUS AGENT DETECTION BY NUCLEIC ACID (DNA OR RNA); SEVERE ACUTE RESPIRATORY SYNDROME CORONAVIRUS 2 (SARS-COV-2) (CORONAVIRUS DISEASE [COVID-19]), AMPLIFIED PROBE TECHNIQUE, MAKING USE OF HIGH THROUGHPUT TECHNOLOGIES AS DESCRIBED BY CMS-2020-01-R: HCPCS | Performed by: FAMILY MEDICINE

## 2020-11-06 LAB
SARS-COV-2 RNA SPEC QL NAA+PROBE: ABNORMAL
SPECIMEN SOURCE: ABNORMAL

## 2020-12-20 ENCOUNTER — HEALTH MAINTENANCE LETTER (OUTPATIENT)
Age: 25
End: 2020-12-20

## 2021-01-19 DIAGNOSIS — B00.2 ORAL HERPES: ICD-10-CM

## 2021-01-19 NOTE — TELEPHONE ENCOUNTER
valACYclovir (VALTREX) 500 MG tablet      Last Written Prescription Date:  1/3/18  Last Fill Quantity: 6,   # refills: 2  Last Office Visit: 11/5/20  Future Office visit:       Routing refill request to provider for review/approval because:  Drug not on the [facility], P or [facility] refill protocol or controlled substance

## 2021-01-20 RX ORDER — VALACYCLOVIR HYDROCHLORIDE 500 MG/1
500 TABLET, FILM COATED ORAL 2 TIMES DAILY
Qty: 6 TABLET | Refills: 2 | Status: SHIPPED | OUTPATIENT
Start: 2021-01-20 | End: 2021-07-07

## 2021-02-04 ENCOUNTER — HOSPITAL ENCOUNTER (EMERGENCY)
Facility: HOSPITAL | Age: 26
Discharge: HOME | End: 2021-02-04
Attending: NURSE PRACTITIONER | Admitting: NURSE PRACTITIONER
Payer: COMMERCIAL

## 2021-02-04 VITALS
OXYGEN SATURATION: 98 % | RESPIRATION RATE: 16 BRPM | DIASTOLIC BLOOD PRESSURE: 79 MMHG | SYSTOLIC BLOOD PRESSURE: 117 MMHG | TEMPERATURE: 98 F | HEART RATE: 90 BPM

## 2021-02-04 DIAGNOSIS — R07.0 THROAT PAIN: ICD-10-CM

## 2021-02-04 LAB
SPECIMEN SOURCE: NORMAL
STREP GROUP A PCR: NOT DETECTED

## 2021-02-04 PROCEDURE — G0463 HOSPITAL OUTPT CLINIC VISIT: HCPCS

## 2021-02-04 PROCEDURE — 99213 OFFICE O/P EST LOW 20 MIN: CPT | Performed by: NURSE PRACTITIONER

## 2021-02-04 PROCEDURE — 87651 STREP A DNA AMP PROBE: CPT | Performed by: EMERGENCY MEDICINE

## 2021-02-04 ASSESSMENT — ENCOUNTER SYMPTOMS
WHEEZING: 0
FEVER: 0
NAUSEA: 0
SINUS PAIN: 0
CHEST TIGHTNESS: 0
COUGH: 1
DYSURIA: 0
SORE THROAT: 1
EYE PAIN: 0
SHORTNESS OF BREATH: 0
FATIGUE: 0
TROUBLE SWALLOWING: 0
VOMITING: 0
EYE REDNESS: 0
APPETITE CHANGE: 0

## 2021-02-04 NOTE — DISCHARGE INSTRUCTIONS
Strep test is negative.     Rest and stay hydrated.    May try chloraseptic spray or lozenges for discomfort.     Please return with any worsening symptoms or concerns.

## 2021-02-04 NOTE — ED PROVIDER NOTES
History     Chief Complaint   Patient presents with     Pharyngitis     HPI  Nerissa Roe is a 25 year old female who presents to the urgent care today with a 3-day history of pharyngitis. Also reporting a mild intermittent, dry cough and nasal congestion. Denies fevers, shortness of breath, nausea, vomiting, rhinorrhea, or sinus pressure. Has not tried any over the counter medications at home. COVID positive in November of 2020.     Allergies:  Allergies   Allergen Reactions     Seasonal Allergies        Problem List:    Patient Active Problem List    Diagnosis Date Noted     ACP (advance care planning) 01/05/2017     Priority: Medium     Advance Care Planning 1/5/2017: ACP Review of Chart / Resources Provided:  Reviewed chart for advance care plan.  Nerissa Roe has been provided information and resources to begin or update their advance care plan.  Added by Tatiana Field             Anxiety 10/22/2015     Priority: Medium     Herpes simplex virus (HSV) infection 02/26/2015     Priority: Medium     Problem list name updated by automated process. Provider to review       Other acne 06/08/2012     Priority: Medium     Erythema nodosum 06/08/2012     Priority: Medium     Atypical nevus 06/08/2012     Priority: Medium        Past Medical History:    Past Medical History:   Diagnosis Date     Anxiety 10/22/2015     Herpes simplex without mention of complication 2/26/2015       Past Surgical History:    No past surgical history on file.    Family History:    Family History   Problem Relation Age of Onset     Cancer Paternal Grandmother         breast cancer     Breast Cancer Paternal Grandmother      Myocardial Infarction Paternal Grandfather      Diabetes Other         family h/o     Heart Disease Other         heart disease - family h/o     Hypertension Other         family h/o     Osteoporosis No family hx of        Social History:  Marital Status:  Single [1]  Social History     Tobacco Use      Smoking status: Never Smoker     Smokeless tobacco: Never Used   Substance Use Topics     Alcohol use: No     Alcohol/week: 0.0 standard drinks     Drug use: No        Medications:         fexofenadine (ALLEGRA) 180 MG tablet       fluticasone (FLONASE) 50 MCG/ACT nasal spray       norelgestromin-ethinyl estradiol (ORTHO EVRA) 150-35 MCG/24HR patch       valACYclovir (VALTREX) 500 MG tablet          Review of Systems   Constitutional: Negative for appetite change, fatigue and fever.   HENT: Positive for congestion and sore throat. Negative for ear discharge, ear pain, sinus pain and trouble swallowing.    Eyes: Negative for pain and redness.   Respiratory: Positive for cough. Negative for chest tightness, shortness of breath and wheezing.    Gastrointestinal: Negative for nausea and vomiting.   Genitourinary: Negative for dysuria.   Skin: Negative for rash.       Physical Exam   BP: 117/79  Pulse: 90  Temp: 98  F (36.7  C)  Resp: 16  SpO2: 98 %      Physical Exam  Vitals signs and nursing note reviewed.   Constitutional:       General: She is not in acute distress.     Appearance: She is ill-appearing.   HENT:      Head: Normocephalic and atraumatic.      Right Ear: Tympanic membrane and ear canal normal.      Left Ear: Tympanic membrane and ear canal normal.      Nose: No congestion or rhinorrhea.      Mouth/Throat:      Mouth: Mucous membranes are moist.      Pharynx: Posterior oropharyngeal erythema present. No oropharyngeal exudate.   Eyes:      Conjunctiva/sclera: Conjunctivae normal.   Cardiovascular:      Rate and Rhythm: Normal rate and regular rhythm.      Pulses: Normal pulses.   Pulmonary:      Effort: Pulmonary effort is normal.      Breath sounds: Normal breath sounds.   Skin:     General: Skin is warm and dry.   Neurological:      Mental Status: She is alert.   Psychiatric:         Mood and Affect: Mood normal.         Behavior: Behavior normal.         ED Course        Procedures                    Results for orders placed or performed during the hospital encounter of 02/04/21 (from the past 24 hour(s))   Group A Streptococcus PCR Throat Swab    Specimen: Throat   Result Value Ref Range    Specimen Description Throat     Strep Group A PCR Not Detected NDET^Not Detected       Medications - No data to display    Assessments & Plan (with Medical Decision Making)     I have reviewed the nursing notes.    I have reviewed the findings, diagnosis, plan and need for follow up with the patient.  Findings as shown above. Physical exam not consistent with streptococcus etiology. Encouraged patient to rest, stay hydrated, and monitor symptoms at this time. May try chloraseptic sprays or lozenges for comfort. Return to the department with any worsening symptoms or other concerns. Patient verbalized understanding and is in agreement with the plan of care.   Patient verbally educated and given appropriate education sheets for each of the diagnoses and has no questions.  Take OTC motrin or tylenol as directed on the bottle as needed.  Increase fluids, wash hands often.  Sleep in a recliner or with multiple pillows until this has resolved.  Follow up with your provider if symptoms increase or if further concerns develop, return to the ER.      New Prescriptions    No medications on file       Final diagnoses:   Throat pain       2/4/2021   [facility]  MIRELLA Arcos Student     I was present with the nurse practitioner student who participated in the service and in the documentation of the note. I have verified the history and personally performed the physical exam and medical decision making. I agree with the assessment and plan of care as documented in the note.     Kate HIRSCH [facility]  Family Nurse Practitioner           Kate Hanley APRN CNP  02/04/21 [id number]

## 2021-02-04 NOTE — ED TRIAGE NOTES
Pt c/o sore throat since Monday morning. Also has had a headache. Throat appears red. Had a covid test done 2 days ago at the [facility]. Strep swab completed.

## 2021-02-16 DIAGNOSIS — Z30.09 GENERAL COUNSELING FOR PRESCRIPTION OF ORAL CONTRACEPTIVES: Primary | ICD-10-CM

## 2021-02-16 RX ORDER — NORETHINDRONE ACETATE AND ETHINYL ESTRADIOL .03; 1.5 MG/1; MG/1
1 TABLET ORAL DAILY
Qty: 84 TABLET | Refills: 1 | Status: SHIPPED | OUTPATIENT
Start: 2021-02-16 | End: 2021-11-16

## 2021-02-16 NOTE — TELEPHONE ENCOUNTER
Patient calling and would like to go back on the pill form of birth control. Patient states the patch is irritating her skin, red and itchy and painful when she itches. Patient is wanting to remove the patch today and not wait for appointment on 02/25. Patient does not want to remove patch until she has another form of birth control. Patient denies any shortness of breath or difficulty swallowing or breathing, chest pain, hives, dizziness, or any other symptoms. Pended medication for approval. PCP out. Please advise, thank you.

## 2021-07-07 DIAGNOSIS — B00.2 ORAL HERPES: ICD-10-CM

## 2021-07-07 RX ORDER — VALACYCLOVIR HYDROCHLORIDE 500 MG/1
TABLET, FILM COATED ORAL
Qty: 6 TABLET | Refills: 0 | Status: SHIPPED | OUTPATIENT
Start: 2021-07-07 | End: 2021-09-22

## 2021-07-07 NOTE — TELEPHONE ENCOUNTER
VALACYCLOVIR  MG TABLET       Last Written Prescription Date:  1/20/21  Last Fill Quantity: 6,   # refills: 2  Last Office Visit: 2/25/21  Future Office visit:       Routing refill request to provider for review/approval because:    Antivirals for Herpes Protocol Failed    Normal serum creatinine on file in past 12 months    Creatinine   Date Value Ref Range Status   04/04/2017 0.77 0.52 - 1.04 mg/dL Final       PCP: Dr. Garland (Out of Office)

## 2021-08-06 DIAGNOSIS — J30.89 ENVIRONMENTAL AND SEASONAL ALLERGIES: ICD-10-CM

## 2021-08-09 RX ORDER — FEXOFENADINE HCL 180 MG/1
TABLET ORAL
Qty: 30 TABLET | Refills: 0 | Status: SHIPPED | OUTPATIENT
Start: 2021-08-09 | End: 2022-04-13

## 2021-08-09 NOTE — TELEPHONE ENCOUNTER
Allegra      Last Written Prescription Date:  8/3/20  Last Fill Quantity: 30,   # refills: 3  Last Office Visit: 2/25/21  Future Office visit:       Routing refill request to provider for review/approval because:

## 2021-09-21 DIAGNOSIS — B00.2 ORAL HERPES: ICD-10-CM

## 2021-09-22 RX ORDER — VALACYCLOVIR HYDROCHLORIDE 500 MG/1
TABLET, FILM COATED ORAL
Qty: 6 TABLET | Refills: 0 | Status: SHIPPED | OUTPATIENT
Start: 2021-09-22 | End: 2021-11-09

## 2021-09-22 NOTE — TELEPHONE ENCOUNTER
Valtrex       Last Written Prescription Date:  7/7/2021  Last Fill Quantity: 6,   # refills: 0  Last Office Visit: 2/25/2021  Future Office visit:

## 2021-10-03 ENCOUNTER — HEALTH MAINTENANCE LETTER (OUTPATIENT)
Age: 26
End: 2021-10-03

## 2021-10-06 ENCOUNTER — NURSE TRIAGE (OUTPATIENT)
Dept: FAMILY MEDICINE | Facility: OTHER | Age: 26
End: 2021-10-06

## 2021-10-06 NOTE — TELEPHONE ENCOUNTER
Protocol advises home care for sinus pain and congestion. Care advice given per protocol. Advised patient to call back for new or worsening symptoms. Patient verbalized understanding.    Reason for Disposition    [1] Sinus congestion as part of a cold AND [2] present < 10 days    Additional Information    Negative: Severe difficulty breathing (e.g., struggling for each breath, speaks in single words)    Negative: Sounds like a life-threatening emergency to the triager    Negative: [1] Sinus infection AND [2] taking an antibiotic AND [3] symptoms continue    Negative: [1] Difficulty breathing AND [2] not from stuffy nose (e.g., not relieved by cleaning out the nose)    Negative: [1] SEVERE headache AND [2] fever    Negative: [1] Redness or swelling on the cheek, forehead or around the eye AND [2] fever    Negative: Fever > 104 F (40 C)    Negative: Patient sounds very sick or weak to the triager    Negative: [1] SEVERE pain AND [2] not improved 2 hours after pain medicine    Negative: [1] Redness or swelling on the cheek, forehead or around the eye AND [2] no fever    Negative: [1] Fever > 101 F (38.3 C) AND [2] age > 60    Negative: [1] Fever > 100.0 F (37.8 C) AND [2] bedridden (e.g., nursing home patient, CVA, chronic illness, recovering from surgery)    Negative: [1] Fever > 100.0 F (37.8 C) AND [2] diabetes mellitus or weak immune system (e.g., HIV positive, cancer chemo, splenectomy, organ transplant, chronic steroids)    Negative: Fever present > 3 days (72 hours)    Negative: [1] Fever returns after gone for over 24 hours AND [2] symptoms worse or not improved    Negative: [1] Sinus pain (not just congestion) AND [2] fever    Negative: Earache    Negative: [1] Sinus congestion (pressure, fullness) AND [2] present > 10 days    Negative: [1] Nasal discharge AND [2] present > 10 days    Negative: [1] Using nasal washes and pain medicine > 24 hours AND [2] sinus pain (around cheekbone or eye) persists     "Negative: Lots of coughing    Answer Assessment - Initial Assessment Questions  1. LOCATION: \"Where does it hurt?\"       Sinuses, cheeks and top of forehead  2. ONSET: \"When did the sinus pain start?\"  (e.g., hours, days)       Monday  3. SEVERITY: \"How bad is the pain?\"   (Scale 1-10; mild, moderate or severe)    - MILD (1-3): doesn't interfere with normal activities     - MODERATE (4-7): interferes with normal activities (e.g., work or school) or awakens from sleep    - SEVERE (8-10): excruciating pain and patient unable to do any normal activities         3/10  4. RECURRENT SYMPTOM: \"Have you ever had sinus problems before?\" If so, ask: \"When was the last time?\" and \"What happened that time?\"       Yes. Usually once a year  5. NASAL CONGESTION: \"Is the nose blocked?\" If so, ask, \"Can you open it or must you breathe through the mouth?\"      no  6. NASAL DISCHARGE: \"Do you have discharge from your nose?\" If so ask, \"What color?\"      Lime green  7. FEVER: \"Do you have a fever?\" If so, ask: \"What is it, how was it measured, and when did it start?\"       no  8. OTHER SYMPTOMS: \"Do you have any other symptoms?\" (e.g., sore throat, cough, earache, difficulty breathing)      Sore throat, heavy to breathe  9. PREGNANCY: \"Is there any chance you are pregnant?\" \"When was your last menstrual period?\"      No    Protocols used: SINUS PAIN OR CONGESTION-A-AH      "

## 2021-11-08 DIAGNOSIS — B00.2 ORAL HERPES: ICD-10-CM

## 2021-11-09 RX ORDER — VALACYCLOVIR HYDROCHLORIDE 500 MG/1
TABLET, FILM COATED ORAL
Qty: 6 TABLET | Refills: 0 | Status: SHIPPED | OUTPATIENT
Start: 2021-11-09 | End: 2021-11-17

## 2021-11-09 NOTE — TELEPHONE ENCOUNTER
acyclovir      Last Written Prescription Date:  9/22/21  Last Fill Quantity: 6,   # refills: 0  Last Office Visit: 2/25/21  Future Office visit:

## 2021-11-17 DIAGNOSIS — B00.2 ORAL HERPES: ICD-10-CM

## 2021-11-17 RX ORDER — VALACYCLOVIR HYDROCHLORIDE 500 MG/1
500 TABLET, FILM COATED ORAL 2 TIMES DAILY
Qty: 18 TABLET | Refills: 3 | Status: SHIPPED | OUTPATIENT
Start: 2021-11-17 | End: 2022-12-27

## 2021-11-17 NOTE — TELEPHONE ENCOUNTER
Patient would like a refill and she would like additional refills on the script so she doesn't have to call in all the time.  Reports she is not having a flair up at this time but would like to have them on hand when she does.  States she always manages to get a flare up on the weekends.    valACYclovir (VALTREX) 500 MG tablet      Last Written Prescription Date:  11/9/21  Last Fill Quantity: 6,   # refills: 0  Last Office Visit: 2/25/21  Future Office visit:       Routing refill request to provider for review/approval because:  Drug not on the [facility], P or [facility] refill protocol or controlled substance

## 2021-12-15 NOTE — PROGRESS NOTES
SUBJECTIVE:   CC: Nerissa Roe is an 26 year old woman who presents for preventive health visit.     Patient has been advised of split billing requirements and indicates understanding: Yes     Healthy Habits:     Getting at least 3 servings of Calcium per day:  Yes    Bi-annual eye exam:  Yes    Dental care twice a year:  Yes    Sleep apnea or symptoms of sleep apnea:  None    Diet:  Regular (no restrictions)    Frequency of exercise:  2-3 days/week    Duration of exercise:  30-45 minutes    Taking medications regularly:  Not Applicable    Barriers to taking medications:  Not applicable    Medication side effects:  Not applicable    PHQ-2 Total Score: 0    Additional concerns today:  No    Flu - declined  Gardisil - declined  COVID - declined    Pap negative 2019 - due 9/2022.  [facility].  G0.  On oral contraceptive.  Stopped it.  Was on pill, then patch, then back to pill, then stopped.  Felt anxious on it.  Feels better.  Less headaches.  Stopped around 3/2021.  Using condoms always.    Regular menses, every 4 weeks, lasting 5 days - normal flow.  No concern for std screening.    Prn Valtrex for HSV.  Cold sores.  Trigger - alcohol.    Allergies - Allegra, flonase.  Spring and summer use.  On hold now.    Anxiety Follow-Up    How are you doing with your anxiety since your last visit? Worsened     Are you having other symptoms that might be associated with anxiety? No    Have you had a significant life event? No     Are you feeling depressed? No    Do you have any concerns with your use of alcohol or other drugs? No     Work is busy - real estate    Can't sleep    Bedtime 9-10pm    Up 2-4 am    Not due to worry    Toss and turn    Past couple months    Prior melatonin - caused irritability    Social History     Tobacco Use     Smoking status: Never Smoker     Smokeless tobacco: Never Used   Substance Use Topics     Alcohol use: No     Alcohol/week: 0.0 standard drinks     Drug use: No      MAGGIE-7 SCORE 8/28/2017 8/3/2020 12/16/2021   Total Score 0 0 0     PHQ 8/28/2017 8/3/2020 12/16/2021   PHQ-9 Total Score 1 0 3   Q9: Thoughts of better off dead/self-harm past 2 weeks Not at all Not at all Not at all     Last PHQ-9 12/16/2021   1.  Little interest or pleasure in doing things 0   2.  Feeling down, depressed, or hopeless 0   3.  Trouble falling or staying asleep, or sleeping too much 3   4.  Feeling tired or having little energy 0   5.  Poor appetite or overeating 0   6.  Feeling bad about yourself 0   7.  Trouble concentrating 0   8.  Moving slowly or restless 0   Q9: Thoughts of better off dead/self-harm past 2 weeks 0   PHQ-9 Total Score 3   Difficulty at work, home, or with people Not difficult at all     MAGGIE-7  12/16/2021   1. Feeling nervous, anxious, or on edge 0   2. Not being able to stop or control worrying 0   3. Worrying too much about different things 0   4. Trouble relaxing 0   5. Being so restless that it is hard to sit still 0   6. Becoming easily annoyed or irritable 0   7. Feeling afraid, as if something awful might happen 0   MAGGIE-7 Total Score 0   If you checked any problems, how difficult have they made it for you to do your work, take care of things at home, or get along with other people? Not difficult at all         Today's PHQ-2 Score:   PHQ-2 ( 1999 Pfizer) 10/2/2020   Q1: Little interest or pleasure in doing things 0   Q2: Feeling down, depressed or hopeless 0   PHQ-2 Score 0   PHQ-2 Total Score (12-17 Years)- Positive if 3 or more points; Administer PHQ-A if positive 0       Abuse: Current or Past (Physical, Sexual or Emotional) - No  Do you feel safe in your environment? Yes    Have you ever done Advance Care Planning? (For example, a Health Directive, POLST, or a discussion with a medical provider or your loved ones about your wishes): No, advance care planning information given to patient to review.  Advanced care planning was discussed at today's visit.    Social  History     Tobacco Use     Smoking status: Never Smoker     Smokeless tobacco: Never Used   Substance Use Topics     Alcohol use: No     Alcohol/week: 0.0 standard drinks     If you drink alcohol do you typically have >3 drinks per day or >7 drinks per week? No    No flowsheet data found.No flowsheet data found.    Reviewed orders with patient.  Reviewed health maintenance and updated orders accordingly - Yes  Current Outpatient Medications   Medication     fexofenadine (ALLEGRA) 180 MG tablet     fluticasone (FLONASE) 50 MCG/ACT nasal spray     norethindrone-ethinyl estradiol (MICROGESTIN 1.5/30) 1.5-30 MG-MCG tablet     valACYclovir (VALTREX) 500 MG tablet     No current facility-administered medications for this visit.       Lab work is in process  Labs reviewed in EPIC    Breast Cancer Screening:  Any new diagnosis of family breast, ovarian, or bowel cancer? Yes       FHS-7:   Breast CA Risk Assessment (FHS-7) 12/16/2021   Did any of your first-degree relatives have breast or ovarian cancer? Yes   Did any of your relatives have bilateral breast cancer? Unknown   Did any man in your family have breast cancer? No   Did any woman in your family have breast and ovarian cancer? Yes   Did any woman in your family have breast cancer before age 50 y? Yes   Do you have 2 or more relatives with breast and/or ovarian cancer? Yes   Do you have 2 or more relatives with breast and/or bowel cancer? No     Paternal uncle - 48 - colon, liver, rectal cancer.    Patient under 40 years of age: Routine Mammogram Screening not recommended.   Pertinent mammograms are reviewed under the imaging tab.    History of abnormal Pap smear: Last 3 Pap and HPV Results:       Reviewed and updated as needed this visit by clinical staff  Tobacco  Allergies  Meds             Reviewed and updated as needed this visit by Provider               Past Medical History:   Diagnosis Date     Anxiety 10/22/2015     Herpes simplex without mention of  "complication 2/26/2015      No past surgical history on file.    Review of Systems  CONSTITUTIONAL: NEGATIVE for fever, chills, change in weight  INTEGUMENTARU/SKIN: NEGATIVE for worrisome rashes, moles or lesions  EYES: NEGATIVE for vision changes or irritation  ENT: NEGATIVE for ear, mouth and throat problems  RESP: NEGATIVE for significant cough or SOB  BREAST: NEGATIVE for masses, tenderness or discharge  CV: NEGATIVE for chest pain, palpitations or peripheral edema  GI: NEGATIVE for nausea, abdominal pain, heartburn, or change in bowel habits  : NEGATIVE for unusual urinary or vaginal symptoms. Periods are regular.  MUSCULOSKELETAL: NEGATIVE for significant arthralgias or myalgia  NEURO: NEGATIVE for weakness, dizziness or paresthesias  PSYCHIATRIC: NEGATIVE for changes in mood or affect     OBJECTIVE:   BP 98/60   Pulse 90   Temp 97.4  F (36.3  C) (Tympanic)   Ht 1.638 m (5' 4.5\")   Wt 58.1 kg (128 lb)   LMP 11/06/2021 (Approximate)   SpO2 98%   BMI 21.63 kg/m    Physical Exam  GENERAL: healthy, alert and no distress  EYES: Eyes grossly normal to inspection, PERRL and conjunctivae and sclerae normal  HENT: ear canals and TM's normal, nose and mouth without ulcers or lesions  NECK: no adenopathy, no asymmetry, masses, or scars and thyroid normal to palpation  RESP: lungs clear to auscultation - no rales, rhonchi or wheezes  BREAST: normal without masses, tenderness or nipple discharge and no palpable axillary masses or adenopathy  CV: regular rate and rhythm, normal S1 S2, no S3 or S4, no murmur, click or rub, no peripheral edema and peripheral pulses strong  ABDOMEN: soft, nontender, no hepatosplenomegaly, no masses and bowel sounds normal   (female): deferred  MS: no gross musculoskeletal defects noted, no edema  SKIN: no suspicious lesions or rashes; birth star left abdomen - faint; fine  NEURO: Normal strength and tone, mentation intact and speech normal  PSYCH: mentation appears normal, affect " "normal/bright    Diagnostic Test Results:  none     ASSESSMENT/PLAN:       ICD-10-CM    1. Encounter for preventive health examination  Z00.00    2. Insomnia, unspecified type  G47.00 hydrOXYzine (VISTARIL) 25 MG capsule   3. Anxiety  F41.9    4. History of cold sores  Z86.19      Pap 2019 - due 2022 - plan for next year with physical.  Continue condom use.  Routine std screening advised.  Vaccines declined.    Anxiety- controlled - declines medication or counseling.    Insomnia - discussed sleep hygiene.  Issues is not falling asleep - only staying.  Discussed vistaril vs Trazodone.  Would like to try vistaril.   Counseled on use.    Patient has been advised of split billing requirements and indicates understanding: Yes  COUNSELING:  Reviewed preventive health counseling, as reflected in patient instructions       Regular exercise       Healthy diet/nutrition       Vision screening       Hearing screening       Immunizations    Declined: Human Papillomavirus and Influenza and COVID due to Conscientious objector           Alcohol Use       Contraception       Folic Acid       Safe sex practices/STD prevention       Consider Hep C screening for all patients one time for ages 18-79 years       HIV screeninx in teen years, 1x in adult years, and at intervals if high risk       Advance Care Planning    Estimated body mass index is 21.63 kg/m  as calculated from the following:    Height as of this encounter: 1.638 m (5' 4.5\").    Weight as of this encounter: 58.1 kg (128 lb).        She reports that she has never smoked. She has never used smokeless tobacco.      Counseling Resources:  ATP IV Guidelines  Pooled Cohorts Equation Calculator  Breast Cancer Risk Calculator  BRCA-Related Cancer Risk Assessment: FHS-7 Tool  FRAX Risk Assessment  ICSI Preventive Guidelines  Dietary Guidelines for Americans, 2010  USDA's MyPlate  ASA Prophylaxis  Lung CA Screening    Ana Lilia Loera MD  [facility] - " [place]

## 2021-12-16 ENCOUNTER — OFFICE VISIT (OUTPATIENT)
Dept: FAMILY MEDICINE | Facility: OTHER | Age: 26
End: 2021-12-16
Attending: FAMILY MEDICINE
Payer: COMMERCIAL

## 2021-12-16 VITALS
HEART RATE: 90 BPM | OXYGEN SATURATION: 98 % | WEIGHT: 128 LBS | SYSTOLIC BLOOD PRESSURE: 98 MMHG | DIASTOLIC BLOOD PRESSURE: 60 MMHG | BODY MASS INDEX: 21.33 KG/M2 | HEIGHT: 65 IN | TEMPERATURE: 97.4 F

## 2021-12-16 DIAGNOSIS — Z00.00 ENCOUNTER FOR PREVENTIVE HEALTH EXAMINATION: Primary | ICD-10-CM

## 2021-12-16 DIAGNOSIS — G47.00 INSOMNIA, UNSPECIFIED TYPE: ICD-10-CM

## 2021-12-16 DIAGNOSIS — Z86.19 HISTORY OF COLD SORES: ICD-10-CM

## 2021-12-16 DIAGNOSIS — F41.9 ANXIETY: ICD-10-CM

## 2021-12-16 PROCEDURE — 99395 PREV VISIT EST AGE 18-39: CPT | Performed by: FAMILY MEDICINE

## 2021-12-16 RX ORDER — HYDROXYZINE PAMOATE 25 MG/1
25-50 CAPSULE ORAL
Qty: 30 CAPSULE | Refills: 1 | Status: SHIPPED | OUTPATIENT
Start: 2021-12-16 | End: 2022-05-04

## 2021-12-16 ASSESSMENT — ANXIETY QUESTIONNAIRES
4. TROUBLE RELAXING: NOT AT ALL
7. FEELING AFRAID AS IF SOMETHING AWFUL MIGHT HAPPEN: NOT AT ALL
1. FEELING NERVOUS, ANXIOUS, OR ON EDGE: NOT AT ALL
GAD7 TOTAL SCORE: 0
3. WORRYING TOO MUCH ABOUT DIFFERENT THINGS: NOT AT ALL
IF YOU CHECKED OFF ANY PROBLEMS ON THIS QUESTIONNAIRE, HOW DIFFICULT HAVE THESE PROBLEMS MADE IT FOR YOU TO DO YOUR WORK, TAKE CARE OF THINGS AT HOME, OR GET ALONG WITH OTHER PEOPLE: NOT DIFFICULT AT ALL
2. NOT BEING ABLE TO STOP OR CONTROL WORRYING: NOT AT ALL
6. BECOMING EASILY ANNOYED OR IRRITABLE: NOT AT ALL
5. BEING SO RESTLESS THAT IT IS HARD TO SIT STILL: NOT AT ALL

## 2021-12-16 ASSESSMENT — PATIENT HEALTH QUESTIONNAIRE - PHQ9: SUM OF ALL RESPONSES TO PHQ QUESTIONS 1-9: 3

## 2021-12-16 ASSESSMENT — PAIN SCALES - GENERAL: PAINLEVEL: NO PAIN (0)

## 2021-12-16 ASSESSMENT — MIFFLIN-ST. JEOR: SCORE: 1318.54

## 2021-12-16 NOTE — NURSING NOTE
"No chief complaint on file.      Initial There were no vitals taken for this visit. Estimated body mass index is 22.73 kg/m  as calculated from the following:    Height as of 10/2/20: 1.626 m (5' 4\").    Weight as of 10/2/20: 60.1 kg (132 lb 6.4 oz).  Medication Reconciliation: complete  Darnell Cuevas LPN    "

## 2021-12-16 NOTE — PATIENT INSTRUCTIONS
Pap 9/2019 - due 9/2022 - plan for next year with physical.  Continue condom use.  Routine std screening advised.  Vaccines declined.  Vistaril as needed for insomnia.    Preventive Health Recommendations  Female Ages 21 to 25     Yearly exam:     See your health care provider every year in order to  o Review health changes.   o Discuss preventive care.    o Review your medicines if your doctor has prescribed any.      You should be tested each year for STDs (sexually transmitted diseases).       Talk to your provider about how often you should have cholesterol testing.      Get a Pap test every three years. If you have an abnormal result, your doctor may have you test more often.      If you are at risk for diabetes, you should have a diabetes test (fasting glucose).     Shots:     Get a flu shot each year.     Get a tetanus shot every 10 years.     Consider getting the shot (vaccine) that prevents cervical cancer (Gardasil).    Nutrition:     Eat at least 5 servings of fruits and vegetables each day.    Eat whole-grain bread, whole-wheat pasta and brown rice instead of white grains and rice.    Get adequate Calcium and Vitamin D.     Lifestyle    Exercise at least 150 minutes a week each week (30 minutes a day, 5 days a week). This will help you control your weight and prevent disease.    Limit alcohol to one drink per day.    No smoking.     Wear sunscreen to prevent skin cancer.    See your dentist every six months for an exam and cleaning.

## 2021-12-17 ASSESSMENT — ANXIETY QUESTIONNAIRES: GAD7 TOTAL SCORE: 0

## 2022-04-13 DIAGNOSIS — J30.89 ENVIRONMENTAL AND SEASONAL ALLERGIES: ICD-10-CM

## 2022-04-13 RX ORDER — FEXOFENADINE HCL 180 MG/1
TABLET ORAL
Qty: 30 TABLET | Refills: 0 | Status: SHIPPED | OUTPATIENT
Start: 2022-04-13 | End: 2023-04-04

## 2022-04-13 NOTE — TELEPHONE ENCOUNTER
Allegra       Last Written Prescription Date:  8/9/21  Last Fill Quantity: 30,   # refills: 0  Last Office Visit: 3/2/22  Future Office visit:    Next 5 appointments (look out 90 days)    May 04, 2022  8:45 AM  (Arrive by 8:30 AM)  SHORT with Ana Lilia Garland MD  [facility] - [place] ([facility] - [place] ) [address]  [place] [address]  [phone]

## 2022-05-02 NOTE — PROGRESS NOTES
Assessment & Plan     Hand dermatitis  No findings on exam today other than slight erythema, peeling, of thumb print region.  Contact dermatitis?  Eczema/dyshidrotic?  Herpetic?  Dermatitis herpetiformis?  With link to her GI symptoms, possible gluten intolerance?  Start with topical steroid.  Update me with photos if flares.  Consider dermatology consult.  - triamcinolone (KENALOG) 0.1 % external cream; Apply topically 2 times daily    Constipation, unspecified constipation type  Long standing, but waxing, waning.  Discussed diet, exercise, fiber supplementation.  Discussed Miralax use regularly if needed, otherwise prn.  Lab evaluation today - evaluate for celiac, inflammatory bowel, anemia, thyroid disease, etc.  - CRP, inflammation; Future  - ESR: Erythrocyte sedimentation rate; Future  - CBC with platelets and differential; Future  - Comprehensive metabolic panel (BMP + Alb, Alk Phos, ALT, AST, Total. Bili, TP); Future  - TSH with free T4 reflex; Future  - Tissue transglutaminase bonnie IgA and IgG; Future  - Endomysial Antibody IgA by IFA; Future    Nausea  As above.  Related to constipation.    - CRP, inflammation; Future  - ESR: Erythrocyte sedimentation rate; Future  - CBC with platelets and differential; Future  - Comprehensive metabolic panel (BMP + Alb, Alk Phos, ALT, AST, Total. Bili, TP); Future  - TSH with free T4 reflex; Future  - Tissue transglutaminase bonnie IgA and IgG; Future  - Endomysial Antibody IgA by IFA; Future       See Patient Instructions        Ana Lilia Loera MD  [facility] - DELIA Multani is a 26 year old who presents for the following health issues     HPI     Rash on right thumb    Onset/Duration: 3 months- off and on week at a time  Description  Location: right thumb - palmar surface  Character: painful, scaly, blister with flare up; pruritis and pain; not active now.  Itching: mild  Intensity:  mild  Progression of Symptoms:  same  Accompanying signs  and symptoms:   Fever: no  Body aches or joint pain: no  Sore throat symptoms: no  Recent cold symptoms: no  History:           Previous episodes of similar rash: None  New exposures:  None  Recent travel: no  Exposure to similar rash: no  Precipitating or alleviating factors: none( Dad has Eczema in hair); uncle as well  Therapies tried and outcome: none  Paperwork.  No chemicals.  No malaise or ill symptoms.    Constipation  Onset/Duration: on going off and on, the newest episode started this past Friday  Description:  Frequency of bowel movements: on a daily basis  Consistency of stool: formed and normal   Progression of Symptoms: same  Accompanying signs and symptoms:    Abdominal pain: YES   Rectal pain: YES   Blood in stool: no   Nausea/Vomiting: YES- nausea   Weight loss or gain: no  History:   Similar problems in past: YES  History of abdominal surgery: no  Chronic laxative use: no  New medications: no  Precipitating or alleviating factors: nothing   Therapies tried and outcome: exercising, Miralax, mag citrate  Had BM today - 5am.  Had been 5 days.  No change in diet.  No alcohol.  Regular exercise - 4 days per week.  Walks dogs, etc.  Mom and grandma - IBS.    Grandma - maternal - colitis.  No diarrhea.  Usually goes once per day.  Formed.  Normal consistency. No blood.        Review of Systems   Constitutional, HEENT, cardiovascular, pulmonary, gi and gu systems are negative, except as otherwise noted.      Objective    /70   Pulse 97   Temp 96.8  F (36  C)   Resp 20   Wt 58.1 kg (128 lb)   SpO2 98%   BMI 21.63 kg/m    Body mass index is 21.63 kg/m .  Physical Exam   GENERAL: healthy, alert and no distress  NECK: no adenopathy, no asymmetry, masses, or scars and thyroid normal to palpation  RESP: lungs clear to auscultation - no rales, rhonchi or wheezes  CV: regular rate and rhythm, normal S1 S2, no S3 or S4, no murmur, click or rub, no peripheral edema and peripheral pulses strong  ABDOMEN:  soft, nontender, no hepatosplenomegaly, no masses and bowel sounds normal  MS: no gross musculoskeletal defects noted, no edema  SKIN: no rashes; right thumb pad with slight pink/erythema, slight peeling; no discrete papules, vesicles, etc  PSYCH: mentation appears normal, affect normal/bright    Labs pending.

## 2022-05-04 ENCOUNTER — OFFICE VISIT (OUTPATIENT)
Dept: FAMILY MEDICINE | Facility: OTHER | Age: 27
End: 2022-05-04
Attending: FAMILY MEDICINE
Payer: COMMERCIAL

## 2022-05-04 VITALS
BODY MASS INDEX: 21.63 KG/M2 | SYSTOLIC BLOOD PRESSURE: 110 MMHG | HEART RATE: 97 BPM | WEIGHT: 128 LBS | DIASTOLIC BLOOD PRESSURE: 70 MMHG | OXYGEN SATURATION: 98 % | TEMPERATURE: 96.8 F | RESPIRATION RATE: 20 BRPM

## 2022-05-04 DIAGNOSIS — K59.00 CONSTIPATION, UNSPECIFIED CONSTIPATION TYPE: ICD-10-CM

## 2022-05-04 DIAGNOSIS — L30.9 HAND DERMATITIS: Primary | ICD-10-CM

## 2022-05-04 DIAGNOSIS — R11.0 NAUSEA: ICD-10-CM

## 2022-05-04 LAB
ALBUMIN SERPL-MCNC: 4 G/DL (ref 3.4–5)
ALP SERPL-CCNC: 64 U/L (ref 40–150)
ALT SERPL W P-5'-P-CCNC: 25 U/L (ref 0–50)
ANION GAP SERPL CALCULATED.3IONS-SCNC: 6 MMOL/L (ref 3–14)
AST SERPL W P-5'-P-CCNC: 21 U/L (ref 0–45)
BASOPHILS # BLD AUTO: 0 10E3/UL (ref 0–0.2)
BASOPHILS NFR BLD AUTO: 0 %
BILIRUB SERPL-MCNC: 0.3 MG/DL (ref 0.2–1.3)
BUN SERPL-MCNC: 12 MG/DL (ref 7–30)
CALCIUM SERPL-MCNC: 9.2 MG/DL (ref 8.5–10.1)
CHLORIDE BLD-SCNC: 105 MMOL/L (ref 94–109)
CO2 SERPL-SCNC: 26 MMOL/L (ref 20–32)
CREAT SERPL-MCNC: 0.64 MG/DL (ref 0.52–1.04)
CRP SERPL-MCNC: 11.2 MG/L (ref 0–8)
EOSINOPHIL # BLD AUTO: 0 10E3/UL (ref 0–0.7)
EOSINOPHIL NFR BLD AUTO: 1 %
ERYTHROCYTE [DISTWIDTH] IN BLOOD BY AUTOMATED COUNT: 13.5 % (ref 10–15)
ERYTHROCYTE [SEDIMENTATION RATE] IN BLOOD BY WESTERGREN METHOD: 9 MM/HR (ref 0–20)
GFR SERPL CREATININE-BSD FRML MDRD: >90 ML/MIN/1.73M2
GLUCOSE BLD-MCNC: 97 MG/DL (ref 70–99)
HCT VFR BLD AUTO: 42.7 % (ref 35–47)
HGB BLD-MCNC: 14.3 G/DL (ref 11.7–15.7)
IMM GRANULOCYTES # BLD: 0 10E3/UL
IMM GRANULOCYTES NFR BLD: 0 %
LYMPHOCYTES # BLD AUTO: 1.9 10E3/UL (ref 0.8–5.3)
LYMPHOCYTES NFR BLD AUTO: 29 %
MCH RBC QN AUTO: 29.2 PG (ref 26.5–33)
MCHC RBC AUTO-ENTMCNC: 33.5 G/DL (ref 31.5–36.5)
MCV RBC AUTO: 87 FL (ref 78–100)
MONOCYTES # BLD AUTO: 0.6 10E3/UL (ref 0–1.3)
MONOCYTES NFR BLD AUTO: 9 %
NEUTROPHILS # BLD AUTO: 4.1 10E3/UL (ref 1.6–8.3)
NEUTROPHILS NFR BLD AUTO: 61 %
NRBC # BLD AUTO: 0 10E3/UL
NRBC BLD AUTO-RTO: 0 /100
PLATELET # BLD AUTO: 233 10E3/UL (ref 150–450)
POTASSIUM BLD-SCNC: 3.9 MMOL/L (ref 3.4–5.3)
PROT SERPL-MCNC: 7.9 G/DL (ref 6.8–8.8)
RBC # BLD AUTO: 4.89 10E6/UL (ref 3.8–5.2)
SODIUM SERPL-SCNC: 137 MMOL/L (ref 133–144)
TSH SERPL DL<=0.005 MIU/L-ACNC: 1.32 MU/L (ref 0.4–4)
WBC # BLD AUTO: 6.7 10E3/UL (ref 4–11)

## 2022-05-04 PROCEDURE — 86140 C-REACTIVE PROTEIN: CPT | Performed by: FAMILY MEDICINE

## 2022-05-04 PROCEDURE — 86364 TISS TRNSGLTMNASE EA IG CLAS: CPT | Performed by: FAMILY MEDICINE

## 2022-05-04 PROCEDURE — 36415 COLL VENOUS BLD VENIPUNCTURE: CPT | Performed by: FAMILY MEDICINE

## 2022-05-04 PROCEDURE — 80050 GENERAL HEALTH PANEL: CPT | Performed by: FAMILY MEDICINE

## 2022-05-04 PROCEDURE — 99214 OFFICE O/P EST MOD 30 MIN: CPT | Performed by: FAMILY MEDICINE

## 2022-05-04 PROCEDURE — 85652 RBC SED RATE AUTOMATED: CPT | Performed by: FAMILY MEDICINE

## 2022-05-04 PROCEDURE — 86231 EMA EACH IG CLASS: CPT | Mod: 90 | Performed by: FAMILY MEDICINE

## 2022-05-04 RX ORDER — TRIAMCINOLONE ACETONIDE 1 MG/G
CREAM TOPICAL 2 TIMES DAILY
Qty: 45 G | Refills: 1 | Status: SHIPPED | OUTPATIENT
Start: 2022-05-04 | End: 2023-05-22

## 2022-05-04 ASSESSMENT — PAIN SCALES - GENERAL: PAINLEVEL: NO PAIN (0)

## 2022-05-04 NOTE — PATIENT INSTRUCTIONS
Trial of topical steroid cream twice daily for flares.  Good emollient - such as Vanicream, Eucerin, Cetaphil.  Gloves for cleaning, dishes.  Update me with photos on My chart if flaring.  Can consider dermatology E consult.    Lab today - will call with results.  Daily fiber supplement - Metamucil or Citrucel.  Add slowly into daily routine.  Miralax - daily if needed.

## 2022-05-04 NOTE — NURSING NOTE
"Chief Complaint   Patient presents with     Derm Problem     Constipation     Nausea       Initial /70   Pulse 97   Temp 96.8  F (36  C)   Resp 20   Wt 58.1 kg (128 lb)   SpO2 98%   BMI 21.63 kg/m   Estimated body mass index is 21.63 kg/m  as calculated from the following:    Height as of 12/16/21: 1.638 m (5' 4.5\").    Weight as of this encounter: 58.1 kg (128 lb).  Medication Reconciliation: complete  Leydi Sarabia LPN    "

## 2022-05-05 LAB
TTG IGA SER-ACNC: 0.5 U/ML
TTG IGG SER-ACNC: <0.6 U/ML

## 2022-05-07 LAB — ENDOMYSIUM IGA TITR SER IF: NORMAL {TITER}

## 2022-05-09 ENCOUNTER — APPOINTMENT (OUTPATIENT)
Dept: URGENT CARE | Facility: CLINIC | Age: 27
End: 2022-05-09

## 2022-09-04 ENCOUNTER — HEALTH MAINTENANCE LETTER (OUTPATIENT)
Age: 27
End: 2022-09-04

## 2022-11-14 ENCOUNTER — OFFICE VISIT (OUTPATIENT)
Dept: CHIROPRACTIC MEDICINE | Facility: OTHER | Age: 27
End: 2022-11-14
Attending: CHIROPRACTOR
Payer: COMMERCIAL

## 2022-11-14 DIAGNOSIS — M99.01 SEGMENTAL AND SOMATIC DYSFUNCTION OF CERVICAL REGION: Primary | ICD-10-CM

## 2022-11-14 DIAGNOSIS — M99.02 SEGMENTAL AND SOMATIC DYSFUNCTION OF THORACIC REGION: ICD-10-CM

## 2022-11-14 DIAGNOSIS — M54.2 CERVICALGIA: ICD-10-CM

## 2022-11-14 PROCEDURE — 98940 CHIROPRACT MANJ 1-2 REGIONS: CPT | Mod: AT | Performed by: CHIROPRACTOR

## 2022-11-14 NOTE — PROGRESS NOTES
Subjective Finding:    Chief compalint: Patient presents with:  Back Pain  , Pain Scale: 4/10, Intensity: sharp, Duration: 2 weeks, Radiating:  no.    Date of injury:     Activities that the pain restricts:   Home/household/hobbies/social activities: yes.  Work duties: yes.  Sleep: yes.  Makes symptoms better: rest.  Makes symptoms worse: activity.  Have you seen anyone else for the symptoms? No.  Work related: no.  Automobile related injury: no.    Objective and Assessment:    Posture Analysis:   High shoulder: .  Head tilt: .  High iliac crest: .  Head carriage: neutral.  Thoracic Kyphosis: neutral.  Lumbar Lordosis: forward.    Lumbar Range of Motion: .  Cervical Range of Motion: .ROM inc  Thoracic Range of Motion: .  Extremity Range of Motion: .    Palpation:   CT jxn pain    Segmental dysfunction pre-treatment and treatment area: C56  T23.    Assessment post-treatment:  Cervical: .  Thoracic: ROM increased.  Lumbar: ROM increased.    Comments: .      Complicating Factors: .    Procedure(s):  CMT:  47428 Chiropractic manipulative treatment 1-2 regions performed   Thoracic: Diversified, See above for level, Prone and Lumbar: Diversified, See above for level, Side posture    Modalities:  None performed this visit    Therapeutic procedures:  None    Plan:  Treatment plan: PRN.  Instructed patient: stretch as instructed at visit.  Short term goals: increase ROM.  Long term goals: restore normal function.  Prognosis: excellent.

## 2022-12-26 DIAGNOSIS — B00.2 ORAL HERPES: ICD-10-CM

## 2022-12-27 RX ORDER — VALACYCLOVIR HYDROCHLORIDE 500 MG/1
TABLET, FILM COATED ORAL
Qty: 18 TABLET | Refills: 0 | Status: SHIPPED | OUTPATIENT
Start: 2022-12-27 | End: 2023-03-03

## 2023-01-09 ENCOUNTER — OFFICE VISIT (OUTPATIENT)
Dept: CHIROPRACTIC MEDICINE | Facility: OTHER | Age: 28
End: 2023-01-09
Payer: COMMERCIAL

## 2023-01-09 DIAGNOSIS — M99.02 SEGMENTAL AND SOMATIC DYSFUNCTION OF THORACIC REGION: Primary | ICD-10-CM

## 2023-01-09 DIAGNOSIS — M99.01 SEGMENTAL AND SOMATIC DYSFUNCTION OF CERVICAL REGION: ICD-10-CM

## 2023-01-09 DIAGNOSIS — M54.2 CERVICALGIA: ICD-10-CM

## 2023-01-09 PROCEDURE — 98940 CHIROPRACT MANJ 1-2 REGIONS: CPT | Mod: AT | Performed by: CHIROPRACTOR

## 2023-01-12 NOTE — PROGRESS NOTES
Subjective Finding:    Chief compalint: Patient presents with:  Neck Pain  , Pain Scale: 4/10, Intensity: sharp, Duration: 2 days, Radiating:  no.    Date of injury:     Activities that the pain restricts:   Home/household/hobbies/social activities: yes.  Work duties: yes.  Sleep: yes.  Makes symptoms better: rest.  Makes symptoms worse: activity.  Have you seen anyone else for the symptoms? No.  Work related: no.  Automobile related injury: no.    Objective and Assessment:    Posture Analysis:   High shoulder: .  Head tilt: .  High iliac crest: .  Head carriage: neutral.  Thoracic Kyphosis: neutral.  Lumbar Lordosis: forward.    Lumbar Range of Motion: .  Cervical Range of Motion: .ROM inc  Thoracic Range of Motion: .  Extremity Range of Motion: .    Palpation:   CT jxn pain    Segmental dysfunction pre-treatment and treatment area: C56  T23.    Assessment post-treatment:  Cervical: .  Thoracic: ROM increased.  Lumbar: ROM increased.    Comments: .      Complicating Factors: .    Procedure(s):  CMT:  34679 Chiropractic manipulative treatment 1-2 regions performed   Thoracic: Diversified, See above for level, Prone and Lumbar: Diversified, See above for level, Side posture    Modalities:  None performed this visit    Therapeutic procedures:  None    Plan:  Treatment plan: PRN.  Instructed patient: stretch as instructed at visit.  Short term goals: increase ROM.  Long term goals: restore normal function.  Prognosis: excellent.

## 2023-01-13 ENCOUNTER — TELEPHONE (OUTPATIENT)
Dept: OBGYN | Facility: OTHER | Age: 28
End: 2023-01-13

## 2023-01-13 NOTE — TELEPHONE ENCOUNTER
"1/13/2023 11:55 AM  Pt called requesting to see OBGYN next week to discuss oral birth control. Pt reports PCP is not available for one month. Pt stated, \"I just don't want to wait that long\". Pt scheduled as she requested.   Nayely Solano RN    "

## 2023-01-16 ENCOUNTER — OFFICE VISIT (OUTPATIENT)
Dept: OBGYN | Facility: OTHER | Age: 28
End: 2023-01-16
Attending: OBSTETRICS & GYNECOLOGY
Payer: COMMERCIAL

## 2023-01-16 VITALS
BODY MASS INDEX: 20.83 KG/M2 | RESPIRATION RATE: 16 BRPM | HEART RATE: 88 BPM | WEIGHT: 125 LBS | DIASTOLIC BLOOD PRESSURE: 62 MMHG | SYSTOLIC BLOOD PRESSURE: 100 MMHG | HEIGHT: 65 IN

## 2023-01-16 DIAGNOSIS — Z30.011 ENCOUNTER FOR INITIAL PRESCRIPTION OF CONTRACEPTIVE PILLS: Primary | ICD-10-CM

## 2023-01-16 PROBLEM — Z71.89 ACP (ADVANCE CARE PLANNING): Chronic | Status: RESOLVED | Noted: 2017-01-05 | Resolved: 2023-01-16

## 2023-01-16 PROCEDURE — 99203 OFFICE O/P NEW LOW 30 MIN: CPT | Performed by: OBSTETRICS & GYNECOLOGY

## 2023-01-16 RX ORDER — NORETHINDRONE ACETATE AND ETHINYL ESTRADIOL 1MG-20(21)
1 KIT ORAL DAILY
Qty: 84 TABLET | Refills: 3 | Status: SHIPPED | OUTPATIENT
Start: 2023-01-16 | End: 2023-05-22

## 2023-01-16 ASSESSMENT — PAIN SCALES - GENERAL: PAINLEVEL: NO PAIN (0)

## 2023-01-16 NOTE — PROGRESS NOTES
CHIEF COMPLAINT / REASON FOR VISIT  Patient presents for Gynecology visit for birth control.    HISTORY OF PRESENT ILLNESS  Nerissa Roe is a 27 year old  with Patient's last menstrual period was 2022. who presents for birth control.  Her menses are regular q 28-30 days, lasting 4 days.  She was previously on birth control pills (Junel OCP) but stopped a year ago to give her body a break and go hormone free.  The patient would now like to restart birth control pills.  She is currently using condoms. No recent herpes infection.    MENSTRUAL HISTORY  Menarche was at age 14.  Menses: regular q 28-30 days.  Menses last: 4-5 days.  Heavy menses: Denies.  Intermenstrual bleeding: Denies.  Dysmenorrhea: Controlled with OTC.  She is sexually active and denies issues with intercourse.   Dyspareunia: Denies.  Postcoital spotting: Denies.  Current contraception: condoms.  STD History: Herpes.  Last Pap smear history: Normal at [facility].  Mammogram history: N/A.    ALLERGIES     Allergies   Allergen Reactions     Seasonal Allergies        MEDICATIONS    Current Outpatient Medications:      fexofenadine (ALLEGRA) 180 MG tablet, TAKE 1 TABLET BY MOUTH DAILY (Patient taking differently: as needed), Disp: 30 tablet, Rfl: 0     fluticasone (FLONASE) 50 MCG/ACT nasal spray, SPRAY 1 SPRAY INTO BOTH NOSTRILS DAILY (Patient taking differently: Spray 1 spray into both nostrils as needed), Disp: 16 g, Rfl: 11     norethindrone-ethinyl estradiol (JUNEL ) 1-20 MG-MCG tablet, Take 1 tablet by mouth daily, Disp: 84 tablet, Rfl: 3     triamcinolone (KENALOG) 0.1 % external cream, Apply topically 2 times daily, Disp: 45 g, Rfl: 1     valACYclovir (VALTREX) 500 MG tablet, TAKE 1 TABLET BY MOUTH TWICE DAILY. (Patient taking differently: as needed), Disp: 18 tablet, Rfl: 0    REVIEW OF SYSTEMS  As per HPI, otherwise negative    The patient's Medical Hx, Surgical Hx, Obstetrical Hx, Social Hx, and Family Hx have  "been reviewed and updated in the electronic medical record.    OBJECTIVE  /62   Pulse 88   Resp 16   Ht 1.638 m (5' 4.5\")   Wt 56.7 kg (125 lb)   LMP 2022   BMI 21.12 kg/m      General:  Well-developed, well-nourished female in no apparent distress.  Neurological: Alert and oriented x3.  Formal examination deferred.    DIAGNOSTICS   Pap normal per patient at [facility]    ASSESSMENT / PLAN  Nerissa Roe is a 27 year old  female who presents for birth control.    1 Contraception counseling    - I discussed contraception options available to the patient.  - The patient desires to restart birth control pills  - I discussed the expected outcome, risk, benefits, alternatives, indication and side effects of oral contraceptive pills (OCP) with the patient.  - I discussed the effectiveness of OCP and that they require reliable administration to be effective. I discussed the importance of compliance with OCP and I recommend that the patient take the OCP at the same time each day.  I reviewed instructions for what to do in the event of a missed pill.  - I discussed the proper method to initiate OCP (i.e.  start after next normal menstrual onset) and to continue the pills daily.  I reviewed the physiology which make the OCP effective.  - I reviewed the anticipated minor side effects such as breakthrough spotting, nausea, breast tenderness, mood changes, weight changes, and headaches.  - I discussed the potential side effect of hypertension in some women.  - I discussed the serious potential rare risks of OCP including venous thrombolic embolism, myocardial infarction, and stroke which are very unlikely. She has been asked to report any signs of such serious problems immediately.  - I advised patient to backup the OCP with a condom during any cycle in which antibiotics are prescribed as well as during her first OCP cycle.  - I reviewed that OCP use does not protect against sexually " transmitted diseases.  - I discussed sexual transmitted disease prevention and recommend condom use.  - I discussed continuous oral contraceptive pill use. I discussed how to use OCP continuously by skipping the placebo pills for 2 packs and then taking the placebo pills with the third pack. This would then be repeated every 3 packs.  - I discussed progestin only birth control pills. I reviewed the side effect of irregular abnormal bleeding especially with first use. I also discussed the risk of amenorrhea.  Progestin only birth control pills require concomitant nursing/ breast-feeding to maximize effectiveness to 99%.  Progestin only birth control pill use without breast-feeding only has effectiveness around 70%.  - The patient verbalized understanding of the risk and benefits and would like to initiate oral contraceptive pills.  - I gave the patient prescription for Junel 1/20 OCP #84 x 3 refill.  - All questions were answered.    - Problem list reviewed and updated.  - Follow up in October for annual examination including pelvic exam and Pap smear.    Paul Tovar MD  Obstetrics and Gynecology

## 2023-03-02 DIAGNOSIS — B00.2 ORAL HERPES: ICD-10-CM

## 2023-03-03 RX ORDER — VALACYCLOVIR HYDROCHLORIDE 500 MG/1
TABLET, FILM COATED ORAL
Qty: 18 TABLET | Refills: 0 | Status: SHIPPED | OUTPATIENT
Start: 2023-03-03 | End: 2023-11-03

## 2023-03-31 DIAGNOSIS — J30.89 ENVIRONMENTAL AND SEASONAL ALLERGIES: ICD-10-CM

## 2023-04-04 RX ORDER — FEXOFENADINE HCL 180 MG/1
TABLET ORAL
Qty: 30 TABLET | Refills: 0 | Status: SHIPPED | OUTPATIENT
Start: 2023-04-04 | End: 2024-04-17

## 2023-04-29 ENCOUNTER — HEALTH MAINTENANCE LETTER (OUTPATIENT)
Age: 28
End: 2023-04-29

## 2023-05-21 ENCOUNTER — HOSPITAL ENCOUNTER (EMERGENCY)
Facility: HOSPITAL | Age: 28
Discharge: HOME | End: 2023-05-21
Attending: PHYSICIAN ASSISTANT | Admitting: PHYSICIAN ASSISTANT
Payer: COMMERCIAL

## 2023-05-21 VITALS
SYSTOLIC BLOOD PRESSURE: 117 MMHG | HEART RATE: 86 BPM | DIASTOLIC BLOOD PRESSURE: 74 MMHG | TEMPERATURE: 97.8 F | RESPIRATION RATE: 16 BRPM | OXYGEN SATURATION: 97 %

## 2023-05-21 DIAGNOSIS — S61.230A PUNCTURE WOUND OF RIGHT INDEX FINGER: ICD-10-CM

## 2023-05-21 PROCEDURE — G0463 HOSPITAL OUTPT CLINIC VISIT: HCPCS

## 2023-05-21 PROCEDURE — 99213 OFFICE O/P EST LOW 20 MIN: CPT | Performed by: PHYSICIAN ASSISTANT

## 2023-05-21 ASSESSMENT — ENCOUNTER SYMPTOMS
ACTIVITY CHANGE: 0
ABDOMINAL PAIN: 0
BACK PAIN: 0
COUGH: 0
HEADACHES: 0
EYE REDNESS: 0
WOUND: 1
FEVER: 0
DYSURIA: 0

## 2023-05-21 NOTE — ED PROVIDER NOTES
History     Chief Complaint   Patient presents with     Puncture Wound     HPI  Nerissa Roe is a 27 year old female who presents for evaluation of puncture wound by kitchen knife to right index finger. Occurred around 1030 AM today. There was bleeding, but this has stopped with pressure and it is scabbed over. The finger is now swollen and tight feeling making it hard to move as usual. There is a feeling of numbness just distal to the wound on the lateral side. Wondering if she needs antibiotics.    Allergies:  Allergies   Allergen Reactions     Seasonal Allergies        Problem List:    Patient Active Problem List    Diagnosis Date Noted     Anxiety 10/22/2015     Priority: Medium     Herpes simplex virus (HSV) infection 02/26/2015     Priority: Medium     Problem list name updated by automated process. Provider to review       Other acne 06/08/2012     Priority: Medium        Past Medical History:    Past Medical History:   Diagnosis Date     Anxiety 10/22/2015     Atypical nevus 6/8/2012     Erythema nodosum 6/8/2012     Herpes simplex virus (HSV) infection 2/26/2015     Herpes simplex without mention of complication 2/26/2015     Other acne 6/8/2012       Past Surgical History:    Past Surgical History:   Procedure Laterality Date     WISDOM TOOTH EXTRACTION Bilateral 2015       Family History:    Family History   Problem Relation Age of Onset     Cancer Paternal Grandmother         breast cancer     Breast Cancer Paternal Grandmother      Myocardial Infarction Paternal Grandfather      Diabetes Other         family h/o     Heart Disease Other         heart disease - family h/o     Hypertension Other         family h/o     Osteoporosis No family hx of        Social History:  Marital Status:  Single [1]  Social History     Tobacco Use     Smoking status: Never     Smokeless tobacco: Never   Substance Use Topics     Alcohol use: No     Alcohol/week: 0.0 standard drinks of alcohol     Drug use: No         Medications:    fexofenadine (ALLEGRA) 180 MG tablet  fluticasone (FLONASE) 50 MCG/ACT nasal spray  norethindrone-ethinyl estradiol (JUNEL FE 1/20) 1-20 MG-MCG tablet  triamcinolone (KENALOG) 0.1 % external cream  valACYclovir (VALTREX) 500 MG tablet          Review of Systems   Constitutional: Negative for activity change and fever.   HENT: Negative for congestion.    Eyes: Negative for redness.   Respiratory: Negative for cough.    Cardiovascular: Negative for chest pain.   Gastrointestinal: Negative for abdominal pain.   Genitourinary: Negative for dysuria.   Musculoskeletal: Negative for back pain.   Skin: Positive for wound.   Neurological: Negative for headaches.       Physical Exam   BP: 117/74  Pulse: 86  Temp: 97.8  F (36.6  C)  Resp: 16  SpO2: 97 %      Physical Exam  Vitals and nursing note reviewed.   Constitutional:       Appearance: Normal appearance. She is normal weight.   HENT:      Head: Normocephalic and atraumatic.      Right Ear: External ear normal.      Left Ear: External ear normal.      Nose: Nose normal.      Mouth/Throat:      Mouth: Mucous membranes are moist.   Eyes:      Conjunctiva/sclera: Conjunctivae normal.   Pulmonary:      Effort: Pulmonary effort is normal.   Musculoskeletal:      Cervical back: Normal range of motion.   Skin:     General: Skin is warm.      Capillary Refill: Capillary refill takes less than 2 seconds.      Comments: Approximately 0.8 cm straight puncture wound noted to palmar lateral right index finger. No active bleeding. Skin surrounding wound is slightly edematous. No erythema. ROM is slightly restricted due to edema (flexion, extension). Normal light touch sensation to tip of R index finger.   Neurological:      General: No focal deficit present.      Mental Status: She is alert and oriented to person, place, and time.   Psychiatric:         Mood and Affect: Mood normal.         Behavior: Behavior normal.         ED Course       No results found for this  or any previous visit (from the past 24 hour(s)).    Medications - No data to display    Assessments & Plan (with Medical Decision Making)     I have reviewed the nursing notes.    I have reviewed the findings, diagnosis, plan and need for follow up with the patient.  Puncture wound R index finger  Normal healing process. No signs of infection at the moment.   Continue to monitor.   Apply abx ointment and bandage for improved wound healing.   Avoid overuse of finger to expedite wound healing.   F/u if needed with concerning symptoms like finger numbness, very painful ROM, streaking redness, pus type drainage.         Medical Decision Making  The patient's presentation was of low complexity (an acute and uncomplicated illness or injury).    The patient's evaluation involved:  history and exam without other MDM data elements    The patient's management necessitated only low risk treatment.        Discharge Medication List as of 5/21/2023  4:18 PM          Final diagnoses:   Puncture wound of right index finger       5/21/2023   [facility]

## 2023-05-21 NOTE — ED TRIAGE NOTES
Pt presents with c/o with accidentally poked right pointer finger with a knife   Pt has swelling and worried about infection  Pt states unable to bend finger.   Last TDAP was in 2015  Happened this morning at 1030  No otc meds this afternoon        Triage Assessment     Row Name 05/21/23 1548       Triage Assessment (Adult)    Airway WDL WDL       Respiratory WDL    Respiratory WDL WDL       Skin Circulation/Temperature WDL    Skin Circulation/Temperature WDL WDL       Cardiac WDL    Cardiac WDL WDL       Peripheral/Neurovascular WDL    Peripheral Neurovascular WDL WDL       Cognitive/Neuro/Behavioral WDL    Cognitive/Neuro/Behavioral WDL WDL

## 2023-05-21 NOTE — ED TRIAGE NOTES
Went to cut cheese with filet knife and accidentally poked knife into right pointer finger. Ecchymotic,painful and limited ROM CMS intact       Triage Assessment     Row Name 05/21/23 8141       Triage Assessment (Adult)    Airway WDL WDL       Respiratory WDL    Respiratory WDL WDL       Skin Circulation/Temperature WDL    Skin Circulation/Temperature WDL WDL       Cardiac WDL    Cardiac WDL WDL       Peripheral/Neurovascular WDL    Peripheral Neurovascular WDL WDL       Cognitive/Neuro/Behavioral WDL    Cognitive/Neuro/Behavioral WDL WDL

## 2023-05-22 ENCOUNTER — OFFICE VISIT (OUTPATIENT)
Dept: FAMILY MEDICINE | Facility: OTHER | Age: 28
End: 2023-05-22
Attending: FAMILY MEDICINE
Payer: COMMERCIAL

## 2023-05-22 VITALS
HEART RATE: 97 BPM | BODY MASS INDEX: 21.66 KG/M2 | WEIGHT: 130 LBS | SYSTOLIC BLOOD PRESSURE: 116 MMHG | TEMPERATURE: 98.2 F | OXYGEN SATURATION: 99 % | DIASTOLIC BLOOD PRESSURE: 70 MMHG | HEIGHT: 65 IN

## 2023-05-22 DIAGNOSIS — L08.9 LOCAL INFECTION OF SKIN AND SUBCUTANEOUS TISSUE: Primary | ICD-10-CM

## 2023-05-22 PROCEDURE — 99213 OFFICE O/P EST LOW 20 MIN: CPT | Performed by: FAMILY MEDICINE

## 2023-05-22 RX ORDER — CEPHALEXIN 500 MG/1
500 CAPSULE ORAL 2 TIMES DAILY
Qty: 14 CAPSULE | Refills: 0 | Status: SHIPPED | OUTPATIENT
Start: 2023-05-22 | End: 2023-05-29

## 2023-05-22 NOTE — PROGRESS NOTES
"  Assessment & Plan     Local infection of skin and subcutaneous tissue  May be more inflamed from work activity and typing.  However, possible early infection.  Cover with antibiotic course.  Discussed elevation, ice, and rest.  Reassess if worsening.  - cephALEXin (KEFLEX) 500 MG capsule; Take 1 capsule (500 mg) by mouth 2 times daily for 7 days       See Patient Instructions    Return if symptoms worsen or fail to improve.    Ana Lilia Loera MD  [facility] - DELIA Multani is a 27 year old, presenting for the following health issues:  cut on right pointer      HPI     Concern - Cut on right pointer finger  Onset: cut finger yesterday 5/21/2023 with a fillet knife   Description: cut fingermyriam feels swollen unable to bend her finger, finger still throbbing  Intensity: mild  Progression of Symptoms:  same  Accompanying Signs & Symptoms: same  Previous history of similar problem: none  Precipitating factors:        Worsened by: none  Alleviating factors:        Improved by: none  Therapies tried and outcome: None    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    More red, swollen, sore.  No fever.        Review of Systems   Constitutional, HEENT, cardiovascular, pulmonary, gi and gu systems are negative, except as otherwise noted.      Objective    /70 (BP Location: Right arm, Patient Position: Sitting, Cuff Size: Adult Regular)   Pulse 97   Temp 98.2  F (36.8  C) (Tympanic)   Ht 1.638 m (5' 4.5\")   Wt 59 kg (130 lb)   SpO2 99%   BMI 21.97 kg/m    Body mass index is 21.97 kg/m .  Physical Exam   GENERAL: healthy, alert and no distress  MS: right index finger with healed over puncture wound with mild surrounding swelling, erythema, tenderness; can bend/extend, but not completely  SKIN: no suspicious lesions or rashes  NEURO: Normal strength and tone, mentation intact and speech normal  PSYCH: mentation appears normal, affect normal/bright                    "

## 2023-05-22 NOTE — PATIENT INSTRUCTIONS
Complete antibiotic course.  Rest, ice, elevated on pillow when sleeping.  Follow up if not improving.

## 2023-11-03 DIAGNOSIS — B00.2 ORAL HERPES: ICD-10-CM

## 2023-11-03 RX ORDER — VALACYCLOVIR HYDROCHLORIDE 500 MG/1
TABLET, FILM COATED ORAL
Qty: 18 TABLET | Refills: 0 | Status: SHIPPED | OUTPATIENT
Start: 2023-11-03 | End: 2024-04-18

## 2023-11-03 NOTE — TELEPHONE ENCOUNTER
VALACYCLOVIR 500 MG      Last Written Prescription Date:  3-3-23  Last Fill Quantity: 18,   # refills: 0  Last Office Visit: 5-22-23

## 2024-04-16 DIAGNOSIS — J30.89 ENVIRONMENTAL AND SEASONAL ALLERGIES: ICD-10-CM

## 2024-04-16 NOTE — TELEPHONE ENCOUNTER
Nitonilea      Last Written Prescription Date:  4/4/23  Last Fill Quantity: 30,   # refills: 0  Last Office Visit: 5/22/23  Future Office visit:       Routing refill request to provider for review/approval because:      Flonase      Last Written Prescription Date:  4/13/22  Last Fill Quantity: 16g,   # refills: 11  Last Office Visit: 5/22/23  Future Office visit:       Routing refill request to provider for review/approval because:

## 2024-04-17 ENCOUNTER — MYC REFILL (OUTPATIENT)
Dept: FAMILY MEDICINE | Facility: OTHER | Age: 29
End: 2024-04-17

## 2024-04-17 DIAGNOSIS — J30.89 ENVIRONMENTAL AND SEASONAL ALLERGIES: ICD-10-CM

## 2024-04-17 RX ORDER — FLUTICASONE PROPIONATE 50 MCG
1 SPRAY, SUSPENSION (ML) NASAL DAILY
Qty: 16 G | Refills: 0 | OUTPATIENT
Start: 2024-04-17

## 2024-04-17 RX ORDER — FEXOFENADINE HCL 180 MG/1
TABLET ORAL
Qty: 30 TABLET | Refills: 0 | Status: SHIPPED | OUTPATIENT
Start: 2024-04-17 | End: 2024-05-07

## 2024-04-17 RX ORDER — FEXOFENADINE HCL 180 MG/1
180 TABLET ORAL DAILY
Qty: 90 TABLET | Refills: 0 | Status: SHIPPED | OUTPATIENT
Start: 2024-04-17

## 2024-04-17 RX ORDER — FLUTICASONE PROPIONATE 50 MCG
1 SPRAY, SUSPENSION (ML) NASAL DAILY
Qty: 16 G | Refills: 0 | Status: SHIPPED | OUTPATIENT
Start: 2024-04-17

## 2024-04-17 NOTE — TELEPHONE ENCOUNTER
Allegra 180 mg   Last Written Prescription Date:  4/4/23  Last Fill Quantity: 30,   # refills: 0  Last Office Visit: 5/22/23  Future Office visit:       Routing refill request to provider for review/approval because:

## 2024-04-18 ENCOUNTER — MYC REFILL (OUTPATIENT)
Dept: FAMILY MEDICINE | Facility: OTHER | Age: 29
End: 2024-04-18

## 2024-04-18 DIAGNOSIS — B00.2 ORAL HERPES: ICD-10-CM

## 2024-04-18 RX ORDER — VALACYCLOVIR HYDROCHLORIDE 500 MG/1
500 TABLET, FILM COATED ORAL 2 TIMES DAILY
Qty: 18 TABLET | Refills: 0 | Status: SHIPPED | OUTPATIENT
Start: 2024-04-18 | End: 2024-10-04

## 2024-04-18 NOTE — TELEPHONE ENCOUNTER
Valacyclovir(Valtrex) 500 MG tablet    Last Written Prescription Date:  11/03/2023  Last Fill Quantity: 18,   # refills: 0  Last Office Visit: 05/22/2023

## 2024-05-07 ENCOUNTER — OFFICE VISIT (OUTPATIENT)
Dept: FAMILY MEDICINE | Facility: OTHER | Age: 29
End: 2024-05-07
Attending: FAMILY MEDICINE
Payer: COMMERCIAL

## 2024-05-07 VITALS
RESPIRATION RATE: 16 BRPM | SYSTOLIC BLOOD PRESSURE: 104 MMHG | TEMPERATURE: 98.3 F | BODY MASS INDEX: 24.29 KG/M2 | HEART RATE: 85 BPM | DIASTOLIC BLOOD PRESSURE: 68 MMHG | OXYGEN SATURATION: 98 % | WEIGHT: 143.7 LBS

## 2024-05-07 DIAGNOSIS — J01.90 ACUTE SINUSITIS WITH SYMPTOMS > 10 DAYS: Primary | ICD-10-CM

## 2024-05-07 PROCEDURE — 99213 OFFICE O/P EST LOW 20 MIN: CPT | Performed by: FAMILY MEDICINE

## 2024-05-07 ASSESSMENT — ANXIETY QUESTIONNAIRES
GAD7 TOTAL SCORE: 0
3. WORRYING TOO MUCH ABOUT DIFFERENT THINGS: NOT AT ALL
7. FEELING AFRAID AS IF SOMETHING AWFUL MIGHT HAPPEN: NOT AT ALL
GAD7 TOTAL SCORE: 0
1. FEELING NERVOUS, ANXIOUS, OR ON EDGE: NOT AT ALL
8. IF YOU CHECKED OFF ANY PROBLEMS, HOW DIFFICULT HAVE THESE MADE IT FOR YOU TO DO YOUR WORK, TAKE CARE OF THINGS AT HOME, OR GET ALONG WITH OTHER PEOPLE?: NOT DIFFICULT AT ALL
4. TROUBLE RELAXING: NOT AT ALL
7. FEELING AFRAID AS IF SOMETHING AWFUL MIGHT HAPPEN: NOT AT ALL
IF YOU CHECKED OFF ANY PROBLEMS ON THIS QUESTIONNAIRE, HOW DIFFICULT HAVE THESE PROBLEMS MADE IT FOR YOU TO DO YOUR WORK, TAKE CARE OF THINGS AT HOME, OR GET ALONG WITH OTHER PEOPLE: NOT DIFFICULT AT ALL
GAD7 TOTAL SCORE: 0
2. NOT BEING ABLE TO STOP OR CONTROL WORRYING: NOT AT ALL
6. BECOMING EASILY ANNOYED OR IRRITABLE: NOT AT ALL
5. BEING SO RESTLESS THAT IT IS HARD TO SIT STILL: NOT AT ALL

## 2024-05-07 ASSESSMENT — PAIN SCALES - GENERAL: PAINLEVEL: NO PAIN (0)

## 2024-05-07 ASSESSMENT — PATIENT HEALTH QUESTIONNAIRE - PHQ9
SUM OF ALL RESPONSES TO PHQ QUESTIONS 1-9: 0
10. IF YOU CHECKED OFF ANY PROBLEMS, HOW DIFFICULT HAVE THESE PROBLEMS MADE IT FOR YOU TO DO YOUR WORK, TAKE CARE OF THINGS AT HOME, OR GET ALONG WITH OTHER PEOPLE: NOT DIFFICULT AT ALL
SUM OF ALL RESPONSES TO PHQ QUESTIONS 1-9: 0

## 2024-05-07 NOTE — PROGRESS NOTES
"  Assessment & Plan     Acute sinusitis with symptoms > 10 days  - amoxicillin-clavulanate (AUGMENTIN) 875-125 MG tablet; Take 1 tablet by mouth 2 times daily for 10 days    Also discussed hypertonic nasal saline.  Follow-up if worsening, not improving as anticipated/discussed, or any new symptoms/concerns.         Kory Multani is a 28 year old, presenting for the following health issues:  URI        5/7/2024     4:32 PM   Additional Questions   Roomed by david márquez lpn   Accompanied by self         5/7/2024     4:32 PM   Patient Reported Additional Medications   Patient reports taking the following new medications none       Flonase and Allegra not helping.  Sinus congestion, \"radioactive green mucus\"  ST in the morning  Coughing up drainage  No fever, no dyspnea, no ear pain  Overall feels fine overall.  No tooth or face pain  Symptoms for about 2 weeks.              Objective    /68 (BP Location: Left arm, Patient Position: Sitting, Cuff Size: Adult Regular)   Pulse 85   Temp 98.3  F (36.8  C) (Tympanic)   Resp 16   Wt 65.2 kg (143 lb 11.2 oz)   LMP 04/16/2024 (Approximate)   SpO2 98%   BMI 24.29 kg/m    Body mass index is 24.29 kg/m .  Physical Exam  Constitutional:       General: She is not in acute distress.     Appearance: Normal appearance.   HENT:      Head: Normocephalic and atraumatic.      Right Ear: Tympanic membrane normal.      Left Ear: Tympanic membrane normal.      Nose: Congestion present.      Mouth/Throat:      Mouth: Mucous membranes are moist.      Pharynx: Oropharynx is clear.   Eyes:      Conjunctiva/sclera: Conjunctivae normal.      Pupils: Pupils are equal, round, and reactive to light.   Cardiovascular:      Rate and Rhythm: Normal rate and regular rhythm.      Heart sounds: Normal heart sounds. No murmur heard.  Pulmonary:      Effort: Pulmonary effort is normal.      Breath sounds: Normal breath sounds.   Lymphadenopathy:      Cervical: No cervical adenopathy. "   Neurological:      Mental Status: She is alert and oriented to person, place, and time.                    Signed Electronically by: LANA RIVERA DO

## 2024-07-06 ENCOUNTER — HEALTH MAINTENANCE LETTER (OUTPATIENT)
Age: 29
End: 2024-07-06

## 2024-10-04 DIAGNOSIS — B00.2 ORAL HERPES: ICD-10-CM

## 2024-10-04 RX ORDER — VALACYCLOVIR HYDROCHLORIDE 500 MG/1
500 TABLET, FILM COATED ORAL 2 TIMES DAILY
Qty: 18 TABLET | Refills: 0 | Status: SHIPPED | OUTPATIENT
Start: 2024-10-04

## 2024-10-04 NOTE — TELEPHONE ENCOUNTER
valACYclovir (VALTREX) 500 MG tablet       Last Written Prescription Date:  4/18/24  Last Fill Quantity: 18,   # refills: 0  Last Office Visit: 5/22/23  Future Office visit:       Routing refill request to provider for review/approval because:

## 2025-01-02 DIAGNOSIS — B00.2 ORAL HERPES: ICD-10-CM

## 2025-01-02 RX ORDER — VALACYCLOVIR HYDROCHLORIDE 500 MG/1
500 TABLET, FILM COATED ORAL 2 TIMES DAILY
Qty: 18 TABLET | Refills: 0 | Status: SHIPPED | OUTPATIENT
Start: 2025-01-02

## 2025-01-23 ENCOUNTER — TELEPHONE (OUTPATIENT)
Dept: FAMILY MEDICINE | Facility: OTHER | Age: 30
End: 2025-01-23

## 2025-03-01 ENCOUNTER — HOSPITAL ENCOUNTER (EMERGENCY)
Facility: HOSPITAL | Age: 30
Discharge: HOME | End: 2025-03-01
Attending: NURSE PRACTITIONER
Payer: COMMERCIAL

## 2025-03-01 VITALS
OXYGEN SATURATION: 97 % | SYSTOLIC BLOOD PRESSURE: 97 MMHG | RESPIRATION RATE: 16 BRPM | HEART RATE: 107 BPM | TEMPERATURE: 97.8 F | DIASTOLIC BLOOD PRESSURE: 69 MMHG

## 2025-03-01 DIAGNOSIS — S60.811A ABRASION OF RIGHT WRIST, INITIAL ENCOUNTER: ICD-10-CM

## 2025-03-01 DIAGNOSIS — S60.410A ABRASION OF RIGHT INDEX FINGER, INITIAL ENCOUNTER: ICD-10-CM

## 2025-03-01 DIAGNOSIS — S61.511A LACERATION OF RIGHT WRIST, INITIAL ENCOUNTER: ICD-10-CM

## 2025-03-01 PROCEDURE — 99283 EMERGENCY DEPT VISIT LOW MDM: CPT | Mod: 25

## 2025-03-01 PROCEDURE — 90715 TDAP VACCINE 7 YRS/> IM: CPT | Performed by: NURSE PRACTITIONER

## 2025-03-01 PROCEDURE — 90471 IMMUNIZATION ADMIN: CPT | Performed by: NURSE PRACTITIONER

## 2025-03-01 PROCEDURE — 12001 RPR S/N/AX/GEN/TRNK 2.5CM/<: CPT

## 2025-03-01 PROCEDURE — 250N000011 HC RX IP 250 OP 636: Performed by: NURSE PRACTITIONER

## 2025-03-01 PROCEDURE — 99282 EMERGENCY DEPT VISIT SF MDM: CPT | Mod: 25 | Performed by: NURSE PRACTITIONER

## 2025-03-01 PROCEDURE — 12001 RPR S/N/AX/GEN/TRNK 2.5CM/<: CPT | Performed by: NURSE PRACTITIONER

## 2025-03-01 RX ADMIN — CLOSTRIDIUM TETANI TOXOID ANTIGEN (FORMALDEHYDE INACTIVATED), CORYNEBACTERIUM DIPHTHERIAE TOXOID ANTIGEN (FORMALDEHYDE INACTIVATED), BORDETELLA PERTUSSIS TOXOID ANTIGEN (GLUTARALDEHYDE INACTIVATED), BORDETELLA PERTUSSIS FILAMENTOUS HEMAGGLUTININ ANTIGEN (FORMALDEHYDE INACTIVATED), BORDETELLA PERTUSSIS PERTACTIN ANTIGEN, AND BORDETELLA PERTUSSIS FIMBRIAE 2/3 ANTIGEN 0.5 ML: 5; 2; 2.5; 5; 3; 5 INJECTION, SUSPENSION INTRAMUSCULAR at 21:08

## 2025-03-01 ASSESSMENT — ENCOUNTER SYMPTOMS
GASTROINTESTINAL NEGATIVE: 1
PSYCHIATRIC NEGATIVE: 1
MUSCULOSKELETAL NEGATIVE: 1
CARDIOVASCULAR NEGATIVE: 1
CONSTITUTIONAL NEGATIVE: 1
NEUROLOGICAL NEGATIVE: 1
HEMATOLOGIC/LYMPHATIC NEGATIVE: 1
ENDOCRINE NEGATIVE: 1
EYES NEGATIVE: 1
RESPIRATORY NEGATIVE: 1
WOUND: 1
ALLERGIC/IMMUNOLOGIC NEGATIVE: 1

## 2025-03-01 ASSESSMENT — COLUMBIA-SUICIDE SEVERITY RATING SCALE - C-SSRS
6. HAVE YOU EVER DONE ANYTHING, STARTED TO DO ANYTHING, OR PREPARED TO DO ANYTHING TO END YOUR LIFE?: NO
1. IN THE PAST MONTH, HAVE YOU WISHED YOU WERE DEAD OR WISHED YOU COULD GO TO SLEEP AND NOT WAKE UP?: NO
2. HAVE YOU ACTUALLY HAD ANY THOUGHTS OF KILLING YOURSELF IN THE PAST MONTH?: NO

## 2025-03-01 ASSESSMENT — ACTIVITIES OF DAILY LIVING (ADL): ADLS_ACUITY_SCORE: 41

## 2025-03-02 NOTE — DISCHARGE INSTRUCTIONS
Laceration care:   You have suffered a laceration to your skin. This wound, despite careful care to irrigate out germs and repair the tissue, is at risk for infection, no matter how the wound is closed or repaired. You need to observe for infection signs like fever, redness, increased pain, discharge like pus, or foul odor. You should see a medical provider for any of these concerns.     Dermabond was used, it is important to not pull the glue off. It will fall off naturally.  When washing, you can get it wet in the shower, but no submersion.  Pat it dry with a towel, but do not rub them.  As stated, this will fall off on its own, do not pick at it.        Follow-up with your primary care provider for reevaluation.  Contact your primary care provider if you have any questions or concerns.  Do not hesitate to return to the ER if any new or worsening symptoms.     Please read the attached instructions (if any).  They highlight more specific treatments and interventions for you at home.              Thank you for letting me participate in your care and wish you a fast and uneventful recovery,    Antonio HIRSCH CNP    Do not hesitate to contact me with questions or concerns.  [email or web address]

## 2025-03-02 NOTE — ED PROVIDER NOTES
History     Chief Complaint   Patient presents with    Laceration     HPI  Nerissa Roe is a 29 year old individual with history of anxiety comes in for right wrist and finger laceration.  Patient states that a cup broke and cut her inner right wrist and the top of her right finger.  Comes in for evaluation of these lacerations.  No loss of range of motion.  No paresthesias reported.    Allergies:  Allergies   Allergen Reactions    Seasonal Allergies        Problem List:    Patient Active Problem List    Diagnosis Date Noted    Anxiety 10/22/2015     Priority: Medium    Herpes simplex virus (HSV) infection 02/26/2015     Priority: Medium     Problem list name updated by automated process. Provider to review      Other acne 06/08/2012     Priority: Medium        Past Medical History:    Past Medical History:   Diagnosis Date    Anxiety 10/22/2015    Atypical nevus 6/8/2012    Erythema nodosum 6/8/2012    Herpes simplex virus (HSV) infection 2/26/2015    Herpes simplex without mention of complication 2/26/2015    Other acne 6/8/2012       Past Surgical History:    Past Surgical History:   Procedure Laterality Date    WISDOM TOOTH EXTRACTION Bilateral 2015       Family History:    Family History   Problem Relation Age of Onset    Cancer Paternal Grandmother         breast cancer    Breast Cancer Paternal Grandmother     Myocardial Infarction Paternal Grandfather     Diabetes Other         family h/o    Heart Disease Other         heart disease - family h/o    Hypertension Other         family h/o    Osteoporosis No family hx of        Social History:  Marital Status:  Single [1]  Social History     Tobacco Use    Smoking status: Never    Smokeless tobacco: Never   Vaping Use    Vaping status: Never Used   Substance Use Topics    Alcohol use: No     Alcohol/week: 0.0 standard drinks of alcohol    Drug use: No        Medications:    fexofenadine (ALLEGRA) 180 MG tablet  fluticasone (FLONASE) 50 MCG/ACT nasal  spray  valACYclovir (VALTREX) 500 MG tablet          Review of Systems   Constitutional: Negative.    HENT: Negative.     Eyes: Negative.    Respiratory: Negative.     Cardiovascular: Negative.    Gastrointestinal: Negative.    Endocrine: Negative.    Genitourinary: Negative.    Musculoskeletal: Negative.    Skin:  Positive for wound (Laceration right index finger and right wrist).   Allergic/Immunologic: Negative.    Neurological: Negative.    Hematological: Negative.    Psychiatric/Behavioral: Negative.         Physical Exam   BP: 97/69  Pulse: 107  Temp: 97.8  F (36.6  C)  Resp: 16  SpO2: 97 %      GENERAL APPEARANCE:  The patient is a 29 year old well-developed, well-nourished individual that appears as stated age.  EXTREMITIES:  No cyanosis, clubbing, or edema.  Radial pulses are 2+ to the right upper extremity.  Capillary refill less than 2 seconds all digits of right hand.  MUSCULOSKELETAL: Full range of motion of the right wrist and all joints and fingers of the right hand.  No crepitus or deformities.  NEUROLOGIC:  No focal sensory or motor deficits are noted.  PSYCHIATRIC:  The patient is awake, alert, and oriented x4.  Recent and remote memory is intact.  Appropriate mood and affect.  Calm and cooperative with history and physical exam.  SKIN:  Warm, dry, and well perfused.  Good turgor.  Abrasion noted over the PIP joint of the right index finger.  No active bleeding or foreign body.  2 cm straight laceration over inner right wrist with no active bleeding or foreign body.  Abrasion also noted to the right inner wrist with no active bleeding or foreign body.  TDAP STATUS: Last Tdap given 04/27/2015.         ED Course     ED Course as of 03/01/25 2121   Sat Mar 01, 2025   2057 In to see patient and history/physical completed.    2104 Tdap ordered.     [facility]    -Laceration Repair    Date/Time: 3/1/2025 9:00 PM    Performed by: Antonio Ross APRN CNP  Authorized by: Antonio Ross APRN  CNP    Risks, benefits and alternatives discussed.    LACERATION DETAILS     Location:  Shoulder/arm    Shoulder/arm location:  R lower arm    Length (cm):  2    Depth (mm):  1    REPAIR TYPE:     Repair type:  Simple    EXPLORATION:     Hemostasis achieved with:  Direct pressure    Wound exploration: wound explored through full range of motion and entire depth of wound probed and visualized      Wound extent: no foreign body, no nerve damage, no tendon damage and no vascular damage      Contaminated: no      TREATMENT:     Area cleansed with:  Soap and water    Amount of cleaning:  Standard    Irrigation solution:  Tap water    Irrigation method:  Tap    Visualized foreign bodies/material removed: no      SKIN REPAIR     Repair method:  Tissue adhesive    APPROXIMATION     Approximation:  Close    POST-PROCEDURE DETAILS     Dressing:  Open (no dressing)      PROCEDURE    Patient Tolerance:  Patient tolerated the procedure well with no immediate complications                No results found for this or any previous visit (from the past 24 hours).    Medications   Tdap (tetanus-diphtheria-acell pertussis) (ADACEL) injection 0.5 mL (0.5 mLs Intramuscular $Given 3/1/25 9857)       Assessments & Plan (with Medical Decision Making)     I have reviewed the nursing notes.    I have reviewed the findings, diagnosis, plan and need for follow up with the patient.    Summary:  Patient presents to the ER today right hand and finger laceration.  Potential diagnosis which have been considered and evaluated include laceration, foreign body, neurovascular injury, as well as others. Many of these have been excluded using the various modalities and assessment as noted on the chart. At the present time, the diagnosis given seems to be the most likely simple laceration to right index finger and right inner wrist  Upon arrival, vitals signs are normal.  The patient is alert and oriented no distress.  Has laceration to right inner wrist  and abrasion to right index finger and also to the right inner wrist.  No active bleeding or foreign body.  CMS intact.  Area cleaned with soap and water on arrival.  Dermabond placed to areas for closure.  Tdap given.  Will discharge patient home to follow-up with PCP in regards for reevaluation of the wound.  Dermabond instructions given.  Return to ER if new or worsening symptoms.  Patient verbalized understanding [facility] plan of care.  Patient discharged home.      Critical Care Time: None    Impression and plan discussed with patient. Questions answered, concerns addressed, indications for urgent re-evaluation reviewed, and  given. Patient/Parent/Caregiver agree with treatment plan and have no further questions at this time.  AVS provided at discharge.    This document was prepared using a combination of typing and voice generated software.  While every attempt was made for accuracy, spelling and grammatical errors may exist.              Discharge Medication List as of 3/1/2025  9:15 PM          Final diagnoses:   Laceration of right wrist, initial encounter   Abrasion of right wrist, initial encounter   Abrasion of right index finger, initial encounter       3/1/2025   [facility]       Antonio Ross APRN CNP  03/01/25 [id number]

## 2025-03-02 NOTE — ED TRIAGE NOTES
Patient presents with lacerations to right wrist and hand from breaking a glass while doing dishes. CMS intact.

## 2025-03-02 NOTE — ED NOTES
Patient presents w/ c/o laceration to right wrist and right index finger, this happened while she was washing the dishes.   CMS intact. Full ROM. See photo.  Bleeding is controlled.  No other complaints.